# Patient Record
Sex: MALE | Race: WHITE | NOT HISPANIC OR LATINO | Employment: OTHER | URBAN - METROPOLITAN AREA
[De-identification: names, ages, dates, MRNs, and addresses within clinical notes are randomized per-mention and may not be internally consistent; named-entity substitution may affect disease eponyms.]

---

## 2017-01-24 ENCOUNTER — HISTORICAL (OUTPATIENT)
Dept: ADMINISTRATIVE | Facility: HOSPITAL | Age: 72
End: 2017-01-24

## 2017-02-22 ENCOUNTER — HISTORICAL (OUTPATIENT)
Dept: ADMINISTRATIVE | Facility: HOSPITAL | Age: 72
End: 2017-02-22

## 2017-02-24 ENCOUNTER — HISTORICAL (OUTPATIENT)
Dept: LAB | Facility: HOSPITAL | Age: 72
End: 2017-02-24

## 2017-03-06 ENCOUNTER — HISTORICAL (OUTPATIENT)
Dept: RADIOLOGY | Facility: HOSPITAL | Age: 72
End: 2017-03-06

## 2017-04-04 ENCOUNTER — HISTORICAL (OUTPATIENT)
Dept: ADMINISTRATIVE | Facility: HOSPITAL | Age: 72
End: 2017-04-04

## 2017-05-02 ENCOUNTER — HISTORICAL (OUTPATIENT)
Dept: ADMINISTRATIVE | Facility: HOSPITAL | Age: 72
End: 2017-05-02

## 2018-05-01 ENCOUNTER — HISTORICAL (OUTPATIENT)
Dept: ADMINISTRATIVE | Facility: HOSPITAL | Age: 73
End: 2018-05-01

## 2019-02-22 ENCOUNTER — HISTORICAL (OUTPATIENT)
Dept: LAB | Facility: HOSPITAL | Age: 74
End: 2019-02-22

## 2019-02-22 LAB
C DIFF INTERP: NEGATIVE
HEMOCCULT SP1 STL QL: POSITIVE
HEMOCCULT SP2 STL QL: POSITIVE

## 2019-02-25 LAB — FINAL CULTURE: NORMAL

## 2019-03-13 ENCOUNTER — HISTORICAL (OUTPATIENT)
Dept: ADMINISTRATIVE | Facility: HOSPITAL | Age: 74
End: 2019-03-13

## 2019-03-29 ENCOUNTER — HISTORICAL (OUTPATIENT)
Dept: ADMINISTRATIVE | Facility: HOSPITAL | Age: 74
End: 2019-03-29

## 2019-05-02 ENCOUNTER — HISTORICAL (OUTPATIENT)
Dept: ADMINISTRATIVE | Facility: HOSPITAL | Age: 74
End: 2019-05-02

## 2020-09-04 ENCOUNTER — HISTORICAL (OUTPATIENT)
Dept: ADMINISTRATIVE | Facility: HOSPITAL | Age: 75
End: 2020-09-04

## 2020-11-04 ENCOUNTER — HISTORICAL (OUTPATIENT)
Dept: ADMINISTRATIVE | Facility: HOSPITAL | Age: 75
End: 2020-11-04

## 2021-03-22 ENCOUNTER — HISTORICAL (OUTPATIENT)
Dept: ADMINISTRATIVE | Facility: HOSPITAL | Age: 76
End: 2021-03-22

## 2021-04-01 ENCOUNTER — HISTORICAL (OUTPATIENT)
Dept: RADIOLOGY | Facility: HOSPITAL | Age: 76
End: 2021-04-01

## 2021-05-19 ENCOUNTER — HISTORICAL (OUTPATIENT)
Dept: RESPIRATORY THERAPY | Facility: HOSPITAL | Age: 76
End: 2021-05-19

## 2021-10-05 ENCOUNTER — HISTORICAL (OUTPATIENT)
Dept: RADIOLOGY | Facility: HOSPITAL | Age: 76
End: 2021-10-05

## 2022-04-09 ENCOUNTER — HISTORICAL (OUTPATIENT)
Dept: ADMINISTRATIVE | Facility: HOSPITAL | Age: 77
End: 2022-04-09

## 2022-04-29 VITALS
SYSTOLIC BLOOD PRESSURE: 130 MMHG | WEIGHT: 250 LBS | WEIGHT: 262 LBS | HEIGHT: 71 IN | BODY MASS INDEX: 34.3 KG/M2 | HEIGHT: 71 IN | OXYGEN SATURATION: 94 % | SYSTOLIC BLOOD PRESSURE: 104 MMHG | WEIGHT: 262 LBS | HEIGHT: 70 IN | DIASTOLIC BLOOD PRESSURE: 70 MMHG | SYSTOLIC BLOOD PRESSURE: 113 MMHG | DIASTOLIC BLOOD PRESSURE: 73 MMHG | HEIGHT: 71 IN | SYSTOLIC BLOOD PRESSURE: 113 MMHG | BODY MASS INDEX: 36.68 KG/M2 | WEIGHT: 245 LBS | DIASTOLIC BLOOD PRESSURE: 60 MMHG | DIASTOLIC BLOOD PRESSURE: 73 MMHG | BODY MASS INDEX: 36.68 KG/M2 | BODY MASS INDEX: 35.79 KG/M2

## 2022-04-30 NOTE — OP NOTE
Patient:   Manjit Argueta            MRN: [id number]            FIN: [id number]               Age:   74 years     Sex:  Male     :  1945   Associated Diagnoses:   None   Author:   Ricardo Donaldson MD      Preoperative Diagnosis: Cataract Right eye    Postoperative Diagnosis: Cataract Right eye    Procedure: Phacoemulsification with intaocular lens implantations Right eye    Surgeon: Ricardo Donaldson MD    Assistant: Graciela Simon, [facility]    Anestheisa: MAC    Complications: None    The patient was brought into the operating suite, where the patient was correctly identified as was the operative eye via timeout.  The patient was prepped and draped in a sterile ophthalmic fashion.  A lid speculum was placed in the operative eye and the microscope was brought into place.  A 1.0mm paracentesis was then made at (12) o'clock.  The anterior chamber was filled with Endocoat.  A (temporal) clear corneal incision was made with a 2.4 mm keratome.  A 6 mm corneal marking ring was used to fouzia the cornea centered over the visual axis.  A 5.00 mm continuous curvilinear capsulorhexis was fashioned using a cystotome and microcapsular forceps.  Hydrodissection and hydrodelineation was performed with upreserved 1% Xylocaine.  The nucleus was then phacoemulsified with the Abbott phacoemulsification hand-piece with a total of (10) EFX.  The cortex was then removed with the I/A hand-piece. The lens model (ZCB00) with a power of (22.0) was placed in the capsular bag.  The Helon was then removed from the eye with the I/A hand piece.  The anterior chamber was inflated and the wounds were hydrated with BSS.  The wounds were checked with Weck-Enedelia sponges and found to be watertight.  The lid speculum was removed and topical antibiotics were placed on the operative eye.  The patient was brought to PACU in good condition.        Surgery Date 19 [facility]

## 2022-04-30 NOTE — OP NOTE
Patient:   Manjit Argueta            MRN: [id number]            FIN: [id number]               Age:   74 years     Sex:  Male     :  1945   Associated Diagnoses:   None   Author:   Ricardo Donaldson MD      Preoperative Diagnosis: Cataract Left eye    Postoperative Diagnosis: Cataract Left eye    Procedure: Phacoemulsification with intaocular lens implantations Left eye    Surgeon: Ricardo Donaldson MD    Assistant:  Graciela Simon, [facility]    Anestheisa: MAC    Complications: None    The patient was brought into the operating suite, where the patient was correctly identified as was the operative eye via timeout.  The patient was prepped and draped in a sterile ophthalmic fashion.  A lid speculum was placed in the operative eye and the microscope was brought into place.  A 1.0mm paracentesis was then made at (6) o'clock.  The anterior chamber was filled with Endocoat.  A (temporal) clear corneal incision was made with a 2.4 mm keratome.  A 6 mm corneal marking ring was used to fouzia the cornea centered over the visual axis.  A 5.00 mm continuous curvilinear capsulorhexis was fashioned using a cystotome and microcapsular forceps.  Hydrodissection and hydrodelineation was performed with upreserved 1% Xylocaine.  The nucleus was then phacoemulsified with the Abbott phacoemulsification hand-piece with a total of (17) EFX.  The cortex was then removed with the I/A hand-piece. The lens model (ZCB00) with a power of (22.0) was placed in the capsular bag.  The Helon was then removed from the eye with the I/A hand piece.  The anterior chamber was inflated and the wounds were hydrated with BSS.  The wounds were checked with Weck-Enedelia sponges and found to be watertight.  The lid speculum was removed and topical antibiotics were placed on the operative eye.  The patient was brought to PACU in good condition.        Surgery Date 19 [facility]

## 2022-05-02 NOTE — HISTORICAL [facility] CERNER
This is a historical note converted from Krystle. Formatting and pictures may have been removed.  Please reference Krystle for original formatting and attached multimedia. Chief Complaint  1 YEAR S/P RIGHT BETTINA. DOING WELL. WORKS OUT OFTEN AT THE CLUB. HE IS CONCERNED WITH LEG LENGTH DISCREPANCY  History of Present Illness  73-year-old male presents today for follow-up after right total hip arthroplasty. ?He is year out from surgery and doing fairly well. ?Denies any complaints of groin pain or other pain to his right leg.? Does have a perceived leg length discrepancy?that he does wear shoe lift of one quarter of an inch?in his left shoe. ?He is very happy overall with the surgery and pain relief.  Review of Systems  Denies fevers, chills, chest pain, shortness of breath. Comprehensive review of systems performed and otherwise negative except as noted in HPI.  Physical Exam  Vitals & Measurements  BP:?113/73?  HT:?180.34?cm? HT:?180.34?cm? WT:?118.84?kg? WT:?118.84?kg? BMI:?36.54?  General: No acute distress, alert and oriented, healthy appearing?  HEENT: Head is atraumatic, mucous membranes are moist  Neck: Supples, no JVD  Cardiovascular: Palpable dorsalis pedis and posterior tibial pulses, regular rate and rhythm to those pulses  Lungs: Breathing non-labored  Skin: no rashes appreciated  Neurologic: Can flex and extend knees, ankles, and toes. Sensation is grossly intact  ?  Hip exam:  Left?hip incision clean dry and intact. No erythema, drainage, or signs of infection  No swelling distally. No signs of DVT  No pain with logroll  Sensation intact distally to right foot  Positive FHL/EHL/gastrocsoleus/tib ant brisk capillary refill right foot  ?  Assessment/Plan  1.?Aftercare following hip joint replacement surgery  ?Patient status post right total hip arthroplasty. ?Is overall doing quite well.? X-rays today really showed no significant leg discrepancy based on the hip alone. ?Does appear to be coming from somewhat  distal. ?He does feel more comfortable with his quadrant shoe lift?and I recommend he continue this until he feels?stable.? We will plan to see him back?in a year.? We did discuss the current recommendations regarding antibiotic prophylaxis prior to any dental work or colonoscopies. Patient is aware of this and will contact her office should they need any prescriptions for antibiotics called in.  Ordered:  Clinic Follow up, *Est. 05/02/19 13:00:00 CDT, Order for future visit, Aftercare following hip joint replacement surgery, [facility]  Post-Op follow-up visit [id number] PC, Aftercare following hip joint replacement surgery, [facility], 05/01/18 14:58:00 CDT  XR Hip Right 2 Views, Routine, *Est. 05/01/19 3:00:00 CDT, Arthritis, None, Patient Bed, Patient Has IV?, Rad Type, Order for future visit, Aftercare following hip joint replacement surgery, Not Scheduled, *Est. 05/01/19 3:00:00 CDT  ?  Avascular necrosis of right femoral head  ?   Problem List/Past Medical History  Ongoing  Acid reflux  Arthritis  Avascular necrosis of right femoral head  Chronic obstructive pulmonary disease  Claustrophobia  Coronary artery disease  Heart attack  Obesity  Obstructive sleep apnea  Historical  Cancer of colon  Hx of Diabetes  Procedure/Surgical History  CARIE LANDEROS Total Hip Arthroplasty (Right) (03/20/2017), Replacement of Right Hip Joint with Synthetic Substitute, Open Approach (03/20/2017), Claviculectomy; partial. (12/05/2012), Coracoacromial ligament release, with or without acromioplasty. (12/05/2012), Other partial ostectomy of scapula, clavicle, and thorax [ribs and sternum] (12/05/2012), Rotator cuff repair (12/05/2012), Cholecystectomy, History of coronary artery disease with stent placement, Partial resection of colon, right shoulder.  Medications  aspirin 81 mg oral tablet, 81 mg= 1 tab(s), Oral, Daily  Co-Q10 200 mg oral capsule, 2 tab(s), Oral, Daily  Colace 100 mg oral capsule, 100 mg= 1 cap(s),  Oral, BID, PRN,? ?Not taking  Effient 10 mg oral tablet, 10 mg= 1 tab(s), Oral, Daily  Fish Oil oral capsule, 1000 IU, Oral, Daily  gabapentin 300 mg oral capsule, See Instructions  Gamma E Complex, Oral, Daily,? ?Not taking  glipiZIDE 2.5 mg oral tablet, extended release (XL tab), 2.5 mg= 1 tab(s), Oral, Daily  metformin 500 mg oral tablet, 500 mg= 1 tab(s), Oral, BID  Metoprolol Tartrate 25 mg oral tablet, 1/2 tab(s), Oral, BID  nitroglycerin 0.4 mg sublingual TAB, 0.4 mg= 1 tab(s), SL, q5min, PRN  Prevagen Extra Strength, Oral, Daily,? ?Not taking  Prilosec 20 mg oral delayed release capsule, 20 mg= 1 cap(s), Oral, Daily  PROAIR HFA 90 MCG INHALER, Oral, QID  ramipril 2.5 mg oral capsule, 2.5 mg= 1 cap(s), Oral, Daily  simvastatin 10 mg oral tablet, 10 mg= 1 tab(s), Oral, Once a day (at bedtime)  simvastatin 20 mg oral tablet, 20 mg= 1 tab(s), Oral, Once a day (at bedtime),? ?Not taking  TAMSULOSIN HCL 0.4 MG CAPSULE, 0.4 mg= 1 cap(s), Oral, Daily,? ?Not taking  Trelegy Ellipta inhalation powder, 1 puff(s), INH, Daily,? ?Not taking  Allergies  codeine?(Anxiety)  morphine?(Anxiety)  Social History  Alcohol  Current, Wine, 1-2 times per week, 12/01/2012  Employment/School  Employed, Highest education level: Some college., 12/01/2012  Home/Environment  Lives with Spouse. Home equipment: CPAP/BiPAP., 03/08/2017  Nutrition/Health  Regular, 12/01/2012  Substance Abuse - Denies Substance Abuse, 12/01/2012  Never, 01/24/2017  Tobacco  Past, Cigarettes, 40 per day., 12/01/2012  Family History  Diabetes mellitus type 2: Brother.  Primary malignant neoplasm of breast: Mother and Sister.  Primary malignant neoplasm of lung: Mother and Father.  Immunizations  Vaccine Date Status Comments   influenza virus vaccine, inactivated 02/2018 Recorded    pneumococcal 13-valent conjugate vaccine 02/17/2017 Recorded Roland   influenza virus vaccine, inactivated 12/2016 Recorded Dr. Castillo   pneumococcal 23-polyvalent vaccine 2015  Recorded    Diagnostic Results  AP lateral right taken and reviewed. ?Patient well fixed components no signs of loosening or subsidence.

## 2022-05-02 NOTE — HISTORICAL [facility] CERNER
This is a historical note converted from Cersana. Formatting and pictures may have been removed.  Please reference Cersana for original formatting and attached multimedia. Chief Complaint  Pt is here for a 1 year follow up right BETTINA. Pt denies pain.  History of Present Illness  74-year-old male presents today for follow-up total hip on the right side. ?Patient is 2 years out. ?He is doing very well. ?Pain is well controlled. ?Has no complaints today. ?He is very happy with his recovery.  Review of Systems  Denies fevers, chills, chest pain, shortness of breath. Comprehensive review of systems performed and otherwise negative except as noted in HPI.  Physical Exam  Vitals & Measurements  BP:?104/70?  HT:?180?cm? WT:?111.13?kg? BMI:?34.3?  General: No acute distress, alert and oriented, healthy appearing?  HEENT: Head is atraumatic, mucous membranes are moist  Neck: Supples, no JVD  Cardiovascular: Palpable dorsalis pedis and posterior tibial pulses, regular rate and rhythm to those pulses  Lungs: Breathing non-labored  Skin: no rashes appreciated  Neurologic: Can flex and extend knees, ankles, and toes. Sensation is grossly intact  ?  Hip exam:  Right?hip incision clean dry and intact. No erythema, drainage, or signs of infection  No swelling distally. No signs of DVT  No pain with logroll  Sensation intact distally to right foot  Positive FHL/EHL/gastrocsoleus/tib ant brisk capillary refill right foot  ?  Assessment/Plan  1.?Aftercare following hip joint replacement surgery?Z47.1  ?74-year-old status post total hip in the right side. ?He is doing great after his surgery. ?We will see him back in 3 years?or sooner should there be any issues.? We did discuss the current recommendations regarding antibiotic prophylaxis prior to any dental work or colonoscopies. Patient is aware of this and will contact her office should they need any prescriptions for antibiotics called in.  Ordered:  Office/Outpatient Visit Level 3  Established [id number] , Aftercare following hip joint replacement surgery, [facility], 05/02/19 13:50:00 CDT  ?  Orders:  Clinic Follow-up PRN, 05/02/19 13:52:00 CDT, Future Order, [facility]  Referrals  Clinic Follow-up PRN, 05/02/19 13:52:00 CDT, Future Order, [facility]   Problem List/Past Medical History  Ongoing  Acid reflux  Arthritis  Avascular necrosis of right femoral head  Chronic obstructive pulmonary disease  Claustrophobia  Coronary artery disease  Heart attack  Obesity  Obstructive sleep apnea  Historical  Cancer of colon  Hx of Diabetes  Procedure/Surgical History  97566 - LT CATARACT W/IOL 1 STA PHACO (Left) (03/29/2019)  Extracapsular cataract removal with insertion of intraocular lens prosthesis (1 stage procedure), manual or mechanical technique (eg, irrigation and aspiration or phacoemulsification) (03/29/2019)  Replacement of Left Lens with Synthetic Substitute, Percutaneous Approach (03/29/2019)  81140 - RT CATARACT W/IOL 1 STA PHACO (Right) (03/13/2019)  Extracapsular cataract removal with insertion of intraocular lens prosthesis (1 stage procedure), manual or mechanical technique (eg, irrigation and aspiration or phacoemulsification) (03/13/2019)  Replacement of Right Lens with Synthetic Substitute, Percutaneous Approach (03/13/2019)  CARIE LANDEROS Total Hip Arthroplasty (Right) (03/20/2017)  Replacement of Right Hip Joint with Synthetic Substitute, Open Approach (03/20/2017)  Claviculectomy; partial. (12/05/2012)  Coracoacromial ligament release, with or without acromioplasty. (12/05/2012)  Other partial ostectomy of scapula, clavicle, and thorax [ribs and sternum] (12/05/2012)  Rotator cuff repair (12/05/2012)  ANGIOGRAM X3  Cholecystectomy  Colonoscopy  History of coronary artery disease with stent placement  Partial resection of colon  right shoulder   Medications  aspirin 81 mg oral tablet, 81 mg= 1 tab(s), Oral, Daily  Co-Q10 200 mg oral capsule, 2 tab(s), Oral,  Daily  Effient 10 mg oral tablet, 10 mg= 1 tab(s), Oral, Daily  Fish Oil oral capsule, 1000 IU, Oral, Daily  glipiZIDE 2.5 mg oral tablet, extended release (XL tab), 2.5 mg= 1 tab(s), Oral, Daily  metformin 500 mg oral tablet, 500 mg= 1 tab(s), Oral, BID  Metoprolol Tartrate 25 mg oral tablet, 1/2 tab(s), Oral, BID  MONTELUKAST SOD 10 MG TABLET, 10 mg= 1 tab(s), Daily,? ?Not Taking per Prescriber  nitroglycerin 0.4 mg sublingual TAB, 0.4 mg= 1 tab(s), SL, q5min, PRN  penicillin V potassium 500 mg oral tablet, See Instructions,? ?Not taking  Prilosec 20 mg oral delayed release capsule, 20 mg= 1 cap(s), Oral, Daily  PROAIR HFA 90 MCG INHALER, Oral, QID  ramipril 2.5 mg oral capsule, 2.5 mg= 1 cap(s), Oral, Daily  simvastatin 10 mg oral tablet, 10 mg= 1 tab(s), Oral, Once a day (at bedtime)  Ventolin HFA 90 mcg/inh inhalation aerosol, 1 puff(s), INH, QID, PRN, 11 refills  Allergies  codeine?(Anxiety)  morphine?(Anxiety)  Social History  Alcohol  Current, Wine, 1-2 times per week, 12/01/2012  Employment/School  Employed, Highest education level: Some college., 12/01/2012  Home/Environment  Lives with Spouse. Home equipment: CPAP/BiPAP., 03/08/2017  Nutrition/Health  Regular, 12/01/2012  Substance Abuse - Denies Substance Abuse, 12/01/2012  Never, 01/24/2017  Tobacco  Former smoker, quit more than 30 days ago, N/A, 05/02/2019  Past, Cigarettes, 40 per day., 12/01/2012  Family History  Diabetes mellitus type 2: Brother.  Primary malignant neoplasm of breast: Mother and Sister.  Primary malignant neoplasm of lung: Mother and Father.  Immunizations  Vaccine Date Status Comments   influenza virus vaccine, inactivated 09/24/2018 Given    influenza virus vaccine, inactivated 02/2018 Recorded    pneumococcal 13-valent conjugate vaccine 02/17/2017 Recorded [facility]   influenza virus vaccine, inactivated 12/2016 Recorded Dr. Castillo   pneumococcal 23-polyvalent vaccine 2015 Recorded    [facility]  Maintenance  ???Pending?(in the next year)  ??? ??OverDue  ??? ? ? ?Coronary Artery Disease Maintenance-Lipid Lowering Therapy due??and every?  ??? ? ? ?Pneumococcal Vaccine due??and every?  ??? ? ? ?Hypertension Management-BMP due??03/21/18??and every 1??year(s)  ??? ? ? ?Coronary Artery Disease Maintenance-BMP due??03/21/18??and every 1??year(s)  ??? ? ? ?Aspirin Therapy for CVD Prevention due??03/21/18??and every 1??year(s)  ??? ? ? ?Advance Directive due??01/01/19??and every 1??year(s)  ??? ? ? ?Alcohol Misuse Screening due??01/01/19??and every 1??year(s)  ??? ? ? ?Cognitive Screening due??01/01/19??and every 1??year(s)  ??? ? ? ?Functional Assessment due??01/01/19??and every 1??year(s)  ??? ? ? ?Geriatric Depression Screening due??01/01/19??and every 1??year(s)  ??? ? ? ?Smoking Cessation due??01/01/19??Variable frequency  ??? ??Due?  ??? ? ? ?ADL Screening due??05/02/19??and every 1??year(s)  ??? ? ? ?COPD Maintenance-Spirometry due??05/02/19??and every?  ??? ? ? ?HF-LVEF due??05/02/19??and every?  ??? ? ? ?Tetanus Vaccine due??05/02/19??and every 10??year(s)  ??? ? ? ?Zoster Vaccine due??05/02/19??and every 100??year(s)  ??? ??Due In Future?  ??? ? ? ?Fall Risk Assessment not due until??01/01/20??and every 1??year(s)  ??? ? ? ?Obesity Screening not due until??01/01/20??and every 1??year(s)  ??? ? ? ?Hypertension Management-Blood Pressure not due until??02/04/20??and every 1??year(s)  ??? ? ? ?Colorectal Screening ([facility]) not due until??02/22/20??and every 1??year(s)  ??? ? ? ?HF-Heart Failure Education not due until??03/28/20??and every 1??year(s)  ??? ? ? ?Coronary Artery Disease Maintenance-Electrocardiogram not due until??03/29/20??and every 1??year(s)  ???Satisfied?(in the past 1 year)  ??? ??Satisfied?  ??? ? ? ?Blood Pressure Screening on??05/02/19.??Satisfied by Carolyn De Jesus LPN  ??? ? ? ?Body Mass Index Check on??05/02/19.??Satisfied by Carolyn De Jesus LPN  ??? ? ? ?Colorectal Screening  ([facility]) on??02/22/19.??Satisfied by Zari Lemus  ??? ? ? ?Coronary Artery Disease Maintenance-Electrocardiogram on??03/29/19.??Satisfied by Laura REYNOSO, Filomena Bowen  ??? ? ? ?Depression Screening on??05/02/19.??Satisfied by Carolyn De Jesus LPN  ??? ? ? ?Fall Risk Assessment on??05/02/19.??Satisfied by Carolyn De Jesus LPN  ??? ? ? ?Hypertension Management-Blood Pressure on??05/02/19.??Satisfied by Carolyn De Jesus LPN  ??? ? ? ?Influenza Vaccine on??09/24/18.??Satisfied by Lida Baptiste  ??? ? ? ?Obesity Screening on??05/02/19.??Satisfied by Carolyn De Jesus LPN  ?  ?  Diagnostic Results  AP lateral right?hip reviewed. ?Patients implants appear well fixed. ?No signs of loosening or subsidence.

## 2022-05-02 NOTE — HISTORICAL [facility] CERNER
This is a historical note converted from Krystle. Formatting and pictures may have been removed.  Please reference Krystle for original formatting and attached multimedia. Chief Complaint  Here for follow up on rt THR 03/20/17 global through 06/18/17  History of Present Illness  72-year-old?male presents for follow-up after?right total hip. ?He is 6 weeks out from surgery. ?Patient is doing very well. ?His pain is very well controlled he is off of all narcotics. ?He is using a single crutch?although he does feel that he is ready for a cane. ?He is very satisfied with his recovery?and feels much improved from preoperative levels.? Denies any groin pain or other issues.  Review of Systems  Denies fevers, chills, chest pain, shortness of breath. Review of systems otherwise negative except as in HPI.? Hip exam:  ?  Physical Exam  Vitals & Measurements  BP:?113/73? HT:?180.34?cm? HT:?180.34?cm? WT:?118.84?kg? WT:?118.84?kg? BMI:?36.54?  Right?hip incision clean dry and intact. No erythema, drainage, or signs of infection  No swelling distally. No signs of DVT  No pain with logroll  Sensation intact distally to right foot  Positive FHL/EHL/gastrocsoleus/tib ant brisk capillary refill right foot  ?  Assessment/Plan  1.?Avascular necrosis of right femoral head  Patient is 6 weeks out from right total hip replacement. ?He is very happy with his recovery.? I have instructed the patient that he should be ready to wean back to a cane?he should do this whenever he is able.? Otherwise he is very happy?and doing very well. ?We will see him back in a years time or sooner should there be any issues.? We did discuss the current recommendations regarding antibiotic prophylaxis prior to any dental work or colonoscopies. Patient is aware of this and will contact her office should they need any prescriptions for antibiotics called in.  Ordered:  Clinic Follow up, *Est. 05/01/18 14:00:00 CDT  Post-Op follow-up visit [id number] PC, Avascular  necrosis of right femoral head  Aftercare following hip joint replacement surgery, [facility], 05/02/17 14:30:00 CDT  XR Hip Right 2 Views, Routine, *Est. 05/02/18 3:00:00 CDT, Arthritis, None, Patient Bed, Patient Has IV?, Rad Type, Order for future visit, Avascular necrosis of right femoral head  Aftercare following hip joint replacement surgery, Not Scheduled, 12, month(s), In Approxi...  ?  2.?Aftercare following hip joint replacement surgery  Ordered:  Clinic Follow up, *Est. 05/01/18 14:00:00 CDT  Post-Op follow-up visit [id number] PC, Avascular necrosis of right femoral head  Aftercare following hip joint replacement surgery, [facility], 05/02/17 14:30:00 CDT  XR Hip Right 2 Views, Routine, *Est. 05/02/18 3:00:00 CDT, Arthritis, None, Patient Bed, Patient Has IV?, Rad Type, Order for future visit, Avascular necrosis of right femoral head  Aftercare following hip joint replacement surgery, Not Scheduled, 12, month(s), In Approxi...  ?   Problem List/Past Medical History  Acid reflux  Arthritis  Avascular necrosis of right femoral head  Claustrophobia  Coronary artery disease  Heart attack  Obesity  Historical  Cancer of colon  Hx of Diabetes  Procedure/Surgical History  CARIEMICHAEL LANDEROS Total Hip Arthroplasty (Right) (03/20/2017), Replacement of Right Hip Joint with Synthetic Substitute, Open Approach (03/20/2017), Claviculectomy; partial. (12/05/2012), Coracoacromial ligament release, with or without acromioplasty. (12/05/2012), Other partial ostectomy of scapula, clavicle, and thorax [ribs and sternum] (12/05/2012), Rotator cuff repair (12/05/2012), Cholecystectomy, History of coronary artery disease with stent placement, Partial resection of colon, right shoulder.  Medications  aspirin 81 mg oral tablet, 81 mg, 1 tab(s), Oral, Daily  Co-Q10 200 mg oral capsule, 2 tab(s), Oral, Daily  Colace 100 mg oral capsule, 100 mg, 1 cap(s), Oral, BID, PRN  Effient 10 mg oral tablet, 10 mg, 1 tab(s),  Oral, Daily  Fish Oil oral capsule, 1000 IU, Oral, Daily  gabapentin 300 mg oral capsule, See Instructions  Gamma E Complex, Oral, Daily  glipiZIDE 2.5 mg oral tablet, extended release (XL tab), 2.5 mg, 1 tab(s), Oral, Daily  metformin 500 mg oral tablet, 500 mg, 1 tab(s), Oral, BID  Metoprolol Tartrate 25 mg oral tablet, 1/2 tab(s), Oral, BID  nitroglycerin 0.4 mg sublingual TAB, 0.4 mg, 1 tab(s), SL, q5min, PRN  Prevagen Extra Strength, Oral, Daily  Prilosec 20 mg oral delayed release capsule, 20 mg, 1 cap(s), Oral, Daily  PROAIR HFA 90 MCG INHALER, Oral, QID  ramipril 2.5 mg oral capsule, 2.5 mg, 1 cap(s), Oral, Daily  simvastatin 20 mg oral tablet, 20 mg, 1 tab(s), Oral, Once a day (at bedtime)  Allergies  codeine?(Anxiety)  morphine?(Anxiety)  Social History  Alcohol  Current, Wine, 1-2 times per week  Employment/School  Employed, Highest education level: Some college.  Home/Environment  Lives with Spouse. Home equipment: CPAP/BiPAP.  Nutrition/Health  Regular  Substance Abuse - Denies Substance Abuse  Never  Tobacco  Past, Cigarettes, 40 per day.  Family History  Diabetes mellitus type 2: Brother.  Primary malignant neoplasm of breast: Mother and Sister.  Primary malignant neoplasm of lung: Mother and Father.  Diagnostic Results  AP lateral right hip taken and reviewed. ?Patient with well fixed components no signs of subsidence or loosening.

## 2022-08-24 DIAGNOSIS — E11.9 TYPE 2 DIABETES MELLITUS WITHOUT COMPLICATION, UNSPECIFIED WHETHER LONG TERM INSULIN USE: Primary | ICD-10-CM

## 2022-10-17 PROBLEM — Q33.2 PULMONARY SEQUESTRATION: Status: ACTIVE | Noted: 2022-10-17

## 2022-10-17 PROBLEM — J44.9 CHRONIC OBSTRUCTIVE PULMONARY DISEASE: Status: ACTIVE | Noted: 2022-10-17

## 2022-11-10 ENCOUNTER — OFFICE VISIT (OUTPATIENT)
Dept: ORTHOPEDICS | Facility: CLINIC | Age: 77
End: 2022-11-10
Payer: MEDICARE

## 2022-11-10 ENCOUNTER — HOSPITAL ENCOUNTER (OUTPATIENT)
Dept: RADIOLOGY | Facility: CLINIC | Age: 77
Discharge: HOME | End: 2022-11-10
Attending: ORTHOPAEDIC SURGERY
Payer: MEDICARE

## 2022-11-10 VITALS
WEIGHT: 246 LBS | SYSTOLIC BLOOD PRESSURE: 104 MMHG | HEIGHT: 69 IN | HEART RATE: 69 BPM | DIASTOLIC BLOOD PRESSURE: 63 MMHG | BODY MASS INDEX: 36.43 KG/M2

## 2022-11-10 DIAGNOSIS — G89.29 CHRONIC PAIN OF LEFT KNEE: ICD-10-CM

## 2022-11-10 DIAGNOSIS — M25.562 CHRONIC PAIN OF LEFT KNEE: ICD-10-CM

## 2022-11-10 DIAGNOSIS — G89.29 CHRONIC PAIN OF LEFT KNEE: Primary | ICD-10-CM

## 2022-11-10 DIAGNOSIS — M70.42 PREPATELLAR BURSITIS OF LEFT KNEE: ICD-10-CM

## 2022-11-10 DIAGNOSIS — M25.562 CHRONIC PAIN OF LEFT KNEE: Primary | ICD-10-CM

## 2022-11-10 PROCEDURE — 1125F PR PAIN SEVERITY QUANTIFIED, PAIN PRESENT: ICD-10-PCS | Mod: CPTII,,, | Performed by: ORTHOPAEDIC SURGERY

## 2022-11-10 PROCEDURE — 3078F PR MOST RECENT DIASTOLIC BLOOD PRESSURE < 80 MM HG: ICD-10-PCS | Mod: CPTII,,, | Performed by: ORTHOPAEDIC SURGERY

## 2022-11-10 PROCEDURE — 1125F AMNT PAIN NOTED PAIN PRSNT: CPT | Mod: CPTII,,, | Performed by: ORTHOPAEDIC SURGERY

## 2022-11-10 PROCEDURE — 3288F FALL RISK ASSESSMENT DOCD: CPT | Mod: CPTII,,, | Performed by: ORTHOPAEDIC SURGERY

## 2022-11-10 PROCEDURE — 1159F PR MEDICATION LIST DOCUMENTED IN MEDICAL RECORD: ICD-10-PCS | Mod: CPTII,,, | Performed by: ORTHOPAEDIC SURGERY

## 2022-11-10 PROCEDURE — 73564 X-RAY EXAM KNEE 4 OR MORE: CPT | Mod: LT,,, | Performed by: ORTHOPAEDIC SURGERY

## 2022-11-10 PROCEDURE — 1159F MED LIST DOCD IN RCRD: CPT | Mod: CPTII,,, | Performed by: ORTHOPAEDIC SURGERY

## 2022-11-10 PROCEDURE — 3288F PR FALLS RISK ASSESSMENT DOCUMENTED: ICD-10-PCS | Mod: CPTII,,, | Performed by: ORTHOPAEDIC SURGERY

## 2022-11-10 PROCEDURE — 99204 OFFICE O/P NEW MOD 45 MIN: CPT | Mod: ,,, | Performed by: ORTHOPAEDIC SURGERY

## 2022-11-10 PROCEDURE — 3074F PR MOST RECENT SYSTOLIC BLOOD PRESSURE < 130 MM HG: ICD-10-PCS | Mod: CPTII,,, | Performed by: ORTHOPAEDIC SURGERY

## 2022-11-10 PROCEDURE — 1101F PR PT FALLS ASSESS DOC 0-1 FALLS W/OUT INJ PAST YR: ICD-10-PCS | Mod: CPTII,,, | Performed by: ORTHOPAEDIC SURGERY

## 2022-11-10 PROCEDURE — 3078F DIAST BP <80 MM HG: CPT | Mod: CPTII,,, | Performed by: ORTHOPAEDIC SURGERY

## 2022-11-10 PROCEDURE — 3074F SYST BP LT 130 MM HG: CPT | Mod: CPTII,,, | Performed by: ORTHOPAEDIC SURGERY

## 2022-11-10 PROCEDURE — 1101F PT FALLS ASSESS-DOCD LE1/YR: CPT | Mod: CPTII,,, | Performed by: ORTHOPAEDIC SURGERY

## 2022-11-10 PROCEDURE — 73564 XR KNEE COMP 4 OR MORE VIEWS LEFT: ICD-10-PCS | Mod: LT,,, | Performed by: ORTHOPAEDIC SURGERY

## 2022-11-10 PROCEDURE — 99204 PR OFFICE/OUTPT VISIT, NEW, LEVL IV, 45-59 MIN: ICD-10-PCS | Mod: ,,, | Performed by: ORTHOPAEDIC SURGERY

## 2022-11-10 RX ORDER — MELOXICAM 15 MG/1
15 TABLET ORAL DAILY
Qty: 30 TABLET | Refills: 0 | Status: SHIPPED | OUTPATIENT
Start: 2022-11-10 | End: 2023-10-04

## 2022-11-10 NOTE — PROGRESS NOTES
Past Medical History:   Diagnosis Date    CAD (coronary artery disease)     followed by Dr. Albarran    Chronic pneumothorax     right side    Colon cancer     Essential (primary) hypertension     H/O heart artery stent     Mixed hyperlipidemia     Type 2 diabetes mellitus without complications        Past Surgical History:   Procedure Laterality Date    CHOLECYSTECTOMY      COLECTOMY      CORONARY ANGIOPLASTY      PLEURODESIS WITH VIDEO-ASSISTED THORACOSCOPIC SURGERY (VATS)      SHOULDER SURGERY Right        Current Outpatient Medications   Medication Sig    albuterol (PROVENTIL/VENTOLIN HFA) 90 mcg/actuation inhaler INHALE 2 PUFFS BY MOUTH 4 TIMES DAILY    aspirin 81 mg Cap aspirin Take No date recorded No form recorded No frequency recorded No route recorded No set duration recorded No set duration amount recorded active No dosage strength recorded No dosage strength units of measure recorded    glipiZIDE (GLUCOTROL) 2.5 MG TR24 glipizide Take No date recorded No form recorded No frequency recorded No route recorded No set duration recorded No set duration amount recorded active No dosage strength recorded No dosage strength units of measure recorded    hydroCHLOROthiazide (MICROZIDE) 12.5 mg capsule hydrochlorothiazide Take No date recorded No form recorded No frequency recorded No route recorded No set duration recorded No set duration amount recorded active No dosage strength recorded No dosage strength units of measure recorded    metoprolol tartrate (LOPRESSOR) 25 MG tablet metoprolol tartrate Take No date recorded No form recorded No frequency recorded No route recorded No set duration recorded No set duration amount recorded active No dosage strength recorded No dosage strength units of measure recorded    nitroGLYCERIN (NITROSTAT) 0.4 MG SL tablet Place 0.4 mg under the tongue every 5 (five) minutes as needed.    omeprazole (PRILOSEC) 20 MG capsule     ramipriL (ALTACE) 2.5 MG capsule     simvastatin  (ZOCOR) 10 MG tablet     tamsulosin (FLOMAX) 0.4 mg Cap      No current facility-administered medications for this visit.       Review of patient's allergies indicates:   Allergen Reactions    Amoxicillin      Other reaction(s): UTI - Urinary tract infection    Codeine      Other reaction(s): Anxiety    Morphine      Other reaction(s): Anxiety       Family History   Problem Relation Age of Onset    Diabetes Mellitus Brother        Social History     Socioeconomic History    Marital status:    Tobacco Use    Smoking status: Former     Packs/day: 2.00     Years: 40.00     Pack years: 80.00     Types: Cigarettes    Smokeless tobacco: Never   Substance and Sexual Activity    Alcohol use: Never    Drug use: Never       Chief Complaint:   Chief Complaint   Patient presents with    Left Knee - Pain     Left knee pain. Been hurting him for 3 days. Can hardly bend it and hard for him to walk. Sensitive on the outside. Pain is staying in knee and not radiating. Pt tried warm and cold compresses and didn't help.        History of present illness:  77-year-old male presents today for evaluation follow-up of left knee pain.  Patient states over the last 5-7 days he has ordered noted worsening pain to the anterior aspect of his left knee.  Significant pain with light touch.  Did have a slight injury was crawling on the ground.  Has noted some mild redness.  No significant swelling.      Review of Systems:    Constitution: Negative for chills, fever, and sweats.  Negative for unexplained weight loss.    HENT:  Negative for headaches and blurry vision.    Cardiovascular:Negative for chest pain or irregular heart beat. Negative for hypertension.    Respiratory:  Negative for cough and shortness of breath.    Gastrointestinal: Negative for abdominal pain, heartburn, melena, nausea, and vomitting.    Genitourinary:  Negative bladder incontinence and dysuria.    Musculoskeletal:  See HPI    Neurological: Negative for  numbness.    Psychiatric/Behavioral: Negative for depression.  The patient is not nervous/anxious.      Endocrine: Negative for polyuria    Hematologic/Lymphatic: Negative for bleeding problem.  Does not bruise/bleed easily.    Skin: Negative for poor would healing and rash      Physical Examination:    Vital Signs:    Vitals:    11/10/22 0819   BP: 104/63   Pulse: 69       Body mass index is 36.33 kg/m².    General: No acute distress, alert and oriented, healthy appearing    HEENT: Head is atraumatic, mucous membranes are moist    Neck: Supples, no JVD    Cardiovascular: Palpable dorsalis pedis and posterior tibial pulses, regular rate and rhythm to those pulses    Lungs: Breathing non-labored    Skin: no rashes appreciated    Neurologic: Can flex and extend knees, ankles, and toes. Sensation is grossly intact    Left knee:  No effusion noted.  Brisk cap refill distally.  Sensation intact distally.  Patient with very significant tenderness over his prepatellar bursa.  Does have a very mild amount of redness.  No induration.    X-rays:  Four views of the left knee reviewed.  No significant arthritic change noted     Assessment::  Left knee prepatellar bursitis    Plan:  We will plan to place him on some topical anti-inflammatories as well as some meloxicam.  We will see him back on an as-needed basis.  If he has worsening, he will contact us.  There was a very small chance this could be infectious etiology although does not appear like that currently.    This note was created using EndPlay voice recognition software that occasionally misinterpreted phrases or words.    Consult note is delivered via Epic messaging service.

## 2023-11-08 DIAGNOSIS — J98.3: ICD-10-CM

## 2023-11-08 DIAGNOSIS — J44.9 CHRONIC OBSTRUCTIVE PULMONARY DISEASE: Primary | ICD-10-CM

## 2023-11-08 DIAGNOSIS — R06.02 SOB (SHORTNESS OF BREATH): ICD-10-CM

## 2025-01-08 ENCOUNTER — TELEPHONE (OUTPATIENT)
Dept: INTERNAL MEDICINE | Facility: CLINIC | Age: 80
End: 2025-01-08
Payer: MEDICARE

## 2025-01-08 NOTE — TELEPHONE ENCOUNTER
----- Message from Yuki sent at 1/8/2025 10:18 AM CST -----  .Who Called: Manjit Argueta    Caller is requesting assistance/information from provider's office.    Symptoms (please be specific): n/a   How long has patient had these symptoms:  n/a  List of preferred pharmacies on file (remove unneeded): [unfilled]  If different, enter pharmacy into here including location and phone number: n/a      Preferred Method of Contact: Phone Call    Patient's Preferred Phone Number on File: [phone]     Best Call Back Number, if different:    Additional Information: Pt called stating he didn't realize his appointment on 01/28 is for 8:40a. Pt stated that time is a little early for him, due to hs wife's passing and he now has to rely on himself to get up and it's pretty hard. Pt is requesting a call if a later time on that date becomes available. Please advise,thank you.

## 2025-01-20 ENCOUNTER — HOSPITAL ENCOUNTER (EMERGENCY)
Facility: HOSPITAL | Age: 80
Discharge: HOME | End: 2025-01-20
Attending: STUDENT IN AN ORGANIZED HEALTH CARE EDUCATION/TRAINING PROGRAM
Payer: MEDICARE

## 2025-01-20 VITALS
OXYGEN SATURATION: 95 % | RESPIRATION RATE: 20 BRPM | SYSTOLIC BLOOD PRESSURE: 119 MMHG | HEIGHT: 71 IN | DIASTOLIC BLOOD PRESSURE: 61 MMHG | HEART RATE: 65 BPM | WEIGHT: 209 LBS | TEMPERATURE: 98 F | BODY MASS INDEX: 29.26 KG/M2

## 2025-01-20 DIAGNOSIS — B99.9 INFECTION: ICD-10-CM

## 2025-01-20 DIAGNOSIS — M70.21 OLECRANON BURSITIS, RIGHT ELBOW: Primary | ICD-10-CM

## 2025-01-20 DIAGNOSIS — L03.113 CELLULITIS OF RIGHT ELBOW: ICD-10-CM

## 2025-01-20 LAB
ALBUMIN SERPL-MCNC: 3.5 G/DL (ref 3.4–4.8)
ALBUMIN/GLOB SERPL: 0.9 RATIO (ref 1.1–2)
ALP SERPL-CCNC: 61 UNIT/L (ref 40–150)
ALT SERPL-CCNC: 14 UNIT/L (ref 0–55)
ANION GAP SERPL CALC-SCNC: 10 MEQ/L
AST SERPL-CCNC: 22 UNIT/L (ref 5–34)
BASOPHILS # BLD AUTO: 0.06 X10(3)/MCL
BASOPHILS NFR BLD AUTO: 0.5 %
BILIRUB SERPL-MCNC: 0.9 MG/DL
BUN SERPL-MCNC: 14.3 MG/DL (ref 8.4–25.7)
CALCIUM SERPL-MCNC: 9.1 MG/DL (ref 8.8–10)
CHLORIDE SERPL-SCNC: 99 MMOL/L (ref 98–107)
CO2 SERPL-SCNC: 28 MMOL/L (ref 23–31)
CREAT SERPL-MCNC: 0.91 MG/DL (ref 0.72–1.25)
CREAT/UREA NIT SERPL: 16
CRP SERPL-MCNC: 34.8 MG/L
EOSINOPHIL # BLD AUTO: 0.29 X10(3)/MCL (ref 0–0.9)
EOSINOPHIL NFR BLD AUTO: 2.5 %
ERYTHROCYTE [DISTWIDTH] IN BLOOD BY AUTOMATED COUNT: 12.4 % (ref 11.5–17)
ERYTHROCYTE [SEDIMENTATION RATE] IN BLOOD: 33 MM/HR (ref 0–15)
GFR SERPLBLD CREATININE-BSD FMLA CKD-EPI: >60 ML/MIN/1.73/M2
GLOBULIN SER-MCNC: 3.7 GM/DL (ref 2.4–3.5)
GLUCOSE SERPL-MCNC: 99 MG/DL (ref 82–115)
HCT VFR BLD AUTO: 47.7 % (ref 42–52)
HGB BLD-MCNC: 15.8 G/DL (ref 14–18)
IMM GRANULOCYTES # BLD AUTO: 0.12 X10(3)/MCL (ref 0–0.04)
IMM GRANULOCYTES NFR BLD AUTO: 1 %
LACTATE SERPL-SCNC: 1.9 MMOL/L (ref 0.5–2.2)
LYMPHOCYTES # BLD AUTO: 2.38 X10(3)/MCL (ref 0.6–4.6)
LYMPHOCYTES NFR BLD AUTO: 20.3 %
MCH RBC QN AUTO: 31.4 PG (ref 27–31)
MCHC RBC AUTO-ENTMCNC: 33.1 G/DL (ref 33–36)
MCV RBC AUTO: 94.8 FL (ref 80–94)
MONOCYTES # BLD AUTO: 0.99 X10(3)/MCL (ref 0.1–1.3)
MONOCYTES NFR BLD AUTO: 8.5 %
NEUTROPHILS # BLD AUTO: 7.87 X10(3)/MCL (ref 2.1–9.2)
NEUTROPHILS NFR BLD AUTO: 67.2 %
NRBC BLD AUTO-RTO: 0 %
PLATELET # BLD AUTO: 315 X10(3)/MCL (ref 130–400)
PMV BLD AUTO: 8.7 FL (ref 7.4–10.4)
POTASSIUM SERPL-SCNC: 4.3 MMOL/L (ref 3.5–5.1)
PROT SERPL-MCNC: 7.2 GM/DL (ref 5.8–7.6)
RBC # BLD AUTO: 5.03 X10(6)/MCL (ref 4.7–6.1)
SODIUM SERPL-SCNC: 137 MMOL/L (ref 136–145)
WBC # BLD AUTO: 11.71 X10(3)/MCL (ref 4.5–11.5)

## 2025-01-20 PROCEDURE — 85652 RBC SED RATE AUTOMATED: CPT | Performed by: NURSE PRACTITIONER

## 2025-01-20 PROCEDURE — 85025 COMPLETE CBC W/AUTO DIFF WBC: CPT | Performed by: NURSE PRACTITIONER

## 2025-01-20 PROCEDURE — 86140 C-REACTIVE PROTEIN: CPT | Performed by: NURSE PRACTITIONER

## 2025-01-20 PROCEDURE — 83605 ASSAY OF LACTIC ACID: CPT | Performed by: NURSE PRACTITIONER

## 2025-01-20 PROCEDURE — 99284 EMERGENCY DEPT VISIT MOD MDM: CPT | Mod: 25

## 2025-01-20 PROCEDURE — 80053 COMPREHEN METABOLIC PANEL: CPT | Performed by: NURSE PRACTITIONER

## 2025-01-20 RX ORDER — SULFAMETHOXAZOLE AND TRIMETHOPRIM 800; 160 MG/1; MG/1
1 TABLET ORAL 2 TIMES DAILY
Qty: 20 TABLET | Refills: 0 | Status: SHIPPED | OUTPATIENT
Start: 2025-01-20 | End: 2025-01-30

## 2025-01-20 NOTE — DISCHARGE INSTRUCTIONS
Bactrim ds twice a day for 10 days. Tylenol and ibuprofen. Keep clean and dry. Avoid hitting it.     Follow up for worsening symptoms as needed with primary care or ortho

## 2025-01-20 NOTE — ED PROVIDER NOTES
"Encounter Date: 1/20/2025       History     Chief Complaint   Patient presents with    Pain     Pt having right arm pain 9/10. Pt states hitting his "funny bone" multiple times over the last 2 weeks causing pain. Some swelling and redness noted. Pt referred from [facility] urgent care for further evaluation of swelling    Arm Injury     See MDM    The history is provided by the patient. No  was used.     Review of patient's allergies indicates:   Allergen Reactions    Amoxicillin      Other reaction(s): UTI - Urinary tract infection    Codeine      Other reaction(s): Anxiety    Morphine      Other reaction(s): Anxiety     Past Medical History:   Diagnosis Date    CAD (coronary artery disease)     followed by Dr. Albarran    Chronic pneumothorax     right side    Colon cancer     Essential (primary) hypertension     H/O heart artery stent     Mixed hyperlipidemia     Type 2 diabetes mellitus without complications      Past Surgical History:   Procedure Laterality Date    CHOLECYSTECTOMY      COLECTOMY      CORONARY ANGIOPLASTY      PLEURODESIS WITH VIDEO-ASSISTED THORACOSCOPIC SURGERY (VATS)      SHOULDER SURGERY Right      Family History   Problem Relation Name Age of Onset    Diabetes Mellitus Brother       Social History     Tobacco Use    Smoking status: Former     Current packs/day: 2.00     Average packs/day: 2.0 packs/day for 40.0 years (80.0 ttl pk-yrs)     Types: Cigarettes    Smokeless tobacco: Never   Substance Use Topics    Alcohol use: Never    Drug use: Never     Review of Systems   Musculoskeletal:  Positive for joint swelling (right elbow pain/swelling/redness).   All other systems reviewed and are negative.      Physical Exam     Initial Vitals [01/20/25 1256]   BP Pulse Resp Temp SpO2   119/61 65 20 97.7 °F (36.5 °C) 95 %      MAP       --         Physical Exam    Nursing note and vitals reviewed.  Constitutional: He appears well-developed and well-nourished.   Cardiovascular:  Normal " rate, regular rhythm, normal heart sounds and intact distal pulses.           Pulmonary/Chest: Breath sounds normal. No respiratory distress.   Musculoskeletal:      Right elbow: Effusion present. Decreased range of motion. Tenderness present.      Comments: Olecranon bursitis with some redness over the skin. He is able to extend completely. Limited flexion to about 80-90 degrees s/t pain he states. Warm over the right elbow but not hot.     All other adjacent joints otherwise normal       Neurological: He is alert and oriented to person, place, and time.   Skin: Skin is warm and dry.   Psychiatric: He has a normal mood and affect.         ED Course   Procedures  Labs Reviewed   CBC WITH DIFFERENTIAL - Abnormal       Result Value    WBC 11.71 (*)     RBC 5.03      Hgb 15.8      Hct 47.7      MCV 94.8 (*)     MCH 31.4 (*)     MCHC 33.1      RDW 12.4      Platelet 315      MPV 8.7      Neut % 67.2      Lymph % 20.3      Mono % 8.5      Eos % 2.5      Basophil % 0.5      Imm Grans % 1.0      Neut # 7.87      Lymph # 2.38      Mono # 0.99      Eos # 0.29      Baso # 0.06      Imm Gran # 0.12 (*)     NRBC% 0.0     COMPREHENSIVE METABOLIC PANEL - Abnormal    Sodium 137      Potassium 4.3      Chloride 99      CO2 28      Glucose 99      Blood Urea Nitrogen 14.3      Creatinine 0.91      Calcium 9.1      Protein Total 7.2      Albumin 3.5      Globulin 3.7 (*)     Albumin/Globulin Ratio 0.9 (*)     Bilirubin Total 0.9      ALP 61      ALT 14      AST 22      eGFR >60      Anion Gap 10.0      BUN/Creatinine Ratio 16     SEDIMENTATION RATE, AUTOMATED - Abnormal    Sed Rate 33 (*)    C-REACTIVE PROTEIN - Abnormal    CRP 34.80 (*)    LACTIC ACID, PLASMA - Normal    Lactic Acid Level 1.9            Imaging Results              X-Ray Elbow Complete Right (Final result)  Result time 01/20/25 14:02:18      Final result by Holland Ruiz MD (01/20/25 14:02:18)                   Impression:      No osseous abnormality  identified.      Electronically signed by: Holland Joseph  Date:    01/20/2025  Time:    14:02               Narrative:    EXAMINATION:  Right elbow    CLINICAL HISTORY:  Unspecified infectious disease    TECHNIQUE:  Three views.    COMPARISON:  None available.    FINDINGS:  Right elbow articular surfaces are unremarkable and there is no intrinsic osseous abnormality.  There is no acute fracture, dislocation or arthritic change.  There is no lytic destructive or sclerotic abnormality.  There is unremarkable mineralization of the bones.  No soft tissue calcifications.                                       Medications - No data to display  Medical Decision Making  78 y/o male who has pmhx htn, diabetes, hypercholesterolemia presents from urgent care for further evaluation and management o fhis right elbow pain, swelling, redness. He states started about 10-14 days ago with swelling and discomfort. He has hit it a few times over the last 2 weeks. He states he went to urgent care on 11th and was given steroids. This helped briefly but then returned. Seems to be getting worse with redness, warmth, pain, swelling. No fever. No chills/sweats. No antibiotics.     He is not toxic in appearance. Wbc 11, sed and crp mildly elevated. Glucose controlled. Lactate neg. Xray neg.     Discussed with dr. Valdez (on call ortho). He reviewed imaging and discussed HPI and findings. He feels can treat with bactrim ds and try to manage outpatient for now. Close ER precautions.     Amount and/or Complexity of Data Reviewed  Labs: ordered. Decision-making details documented in ED Course.  Radiology: ordered. Decision-making details documented in ED Course.  Discussion of management or test interpretation with external provider(s): Discussed with Dr. Valdez (on call ortho). See above for recommendations.     Risk  Prescription drug management.      Additional MDM:   Differential Diagnosis:   Other: The following diagnoses were also  considered and will be evaluated: olecranon bursitis, cellulitis and septic joint.                                   Clinical Impression:  Final diagnoses:  [B99.9] Infection  [M70.21] Olecranon bursitis, right elbow (Primary)  [L03.113] Cellulitis of right elbow          ED Disposition Condition    Discharge Stable          ED Prescriptions       Medication Sig Dispense Start Date End Date Auth. Provider    sulfamethoxazole-trimethoprim 800-160mg (BACTRIM DS) 800-160 mg Tab Take 1 tablet by mouth 2 (two) times daily. for 10 days 20 tablet 1/20/2025 1/30/2025 Chelsea Atkinson FNP          Follow-up Information       Follow up With Specialties Details Why Contact Info    Brenda Wilkerson MD Internal Medicine Call in 1 week As needed [address]  [place] [id number] [phone]               Chelsea Atkinson FNP  01/20/25 0883

## 2025-01-24 ENCOUNTER — TELEPHONE (OUTPATIENT)
Dept: INTERNAL MEDICINE | Facility: CLINIC | Age: 80
End: 2025-01-24
Payer: MEDICARE

## 2025-01-28 ENCOUNTER — OFFICE VISIT (OUTPATIENT)
Dept: INTERNAL MEDICINE | Facility: CLINIC | Age: 80
End: 2025-01-28
Payer: MEDICARE

## 2025-01-28 VITALS
HEIGHT: 71 IN | BODY MASS INDEX: 29.82 KG/M2 | WEIGHT: 213 LBS | OXYGEN SATURATION: 97 % | TEMPERATURE: 98 F | SYSTOLIC BLOOD PRESSURE: 98 MMHG | DIASTOLIC BLOOD PRESSURE: 55 MMHG | HEART RATE: 60 BPM

## 2025-01-28 DIAGNOSIS — E11.69 TYPE 2 DIABETES MELLITUS WITH OTHER SPECIFIED COMPLICATION, WITHOUT LONG-TERM CURRENT USE OF INSULIN: ICD-10-CM

## 2025-01-28 DIAGNOSIS — C18.9 MALIGNANT NEOPLASM OF COLON, UNSPECIFIED PART OF COLON: ICD-10-CM

## 2025-01-28 DIAGNOSIS — J43.1 PANLOBULAR EMPHYSEMA: ICD-10-CM

## 2025-01-28 DIAGNOSIS — M70.31 BURSITIS OF RIGHT ELBOW, UNSPECIFIED BURSA: Primary | ICD-10-CM

## 2025-01-28 DIAGNOSIS — N40.0 ENLARGED PROSTATE: ICD-10-CM

## 2025-01-28 DIAGNOSIS — M87.059 AVASCULAR NECROSIS OF FEMORAL HEAD, UNSPECIFIED LATERALITY: ICD-10-CM

## 2025-01-28 PROCEDURE — 3074F SYST BP LT 130 MM HG: CPT | Mod: CPTII,,, | Performed by: STUDENT IN AN ORGANIZED HEALTH CARE EDUCATION/TRAINING PROGRAM

## 2025-01-28 PROCEDURE — 1159F MED LIST DOCD IN RCRD: CPT | Mod: CPTII,,, | Performed by: STUDENT IN AN ORGANIZED HEALTH CARE EDUCATION/TRAINING PROGRAM

## 2025-01-28 PROCEDURE — 3078F DIAST BP <80 MM HG: CPT | Mod: CPTII,,, | Performed by: STUDENT IN AN ORGANIZED HEALTH CARE EDUCATION/TRAINING PROGRAM

## 2025-01-28 PROCEDURE — 1160F RVW MEDS BY RX/DR IN RCRD: CPT | Mod: CPTII,,, | Performed by: STUDENT IN AN ORGANIZED HEALTH CARE EDUCATION/TRAINING PROGRAM

## 2025-01-28 PROCEDURE — 1101F PT FALLS ASSESS-DOCD LE1/YR: CPT | Mod: CPTII,,, | Performed by: STUDENT IN AN ORGANIZED HEALTH CARE EDUCATION/TRAINING PROGRAM

## 2025-01-28 PROCEDURE — 3288F FALL RISK ASSESSMENT DOCD: CPT | Mod: CPTII,,, | Performed by: STUDENT IN AN ORGANIZED HEALTH CARE EDUCATION/TRAINING PROGRAM

## 2025-01-28 PROCEDURE — 99204 OFFICE O/P NEW MOD 45 MIN: CPT | Mod: ,,, | Performed by: STUDENT IN AN ORGANIZED HEALTH CARE EDUCATION/TRAINING PROGRAM

## 2025-01-28 RX ORDER — ORAL SEMAGLUTIDE 14 MG/1
14 TABLET ORAL EVERY MORNING
COMMUNITY
Start: 2025-01-03

## 2025-01-29 ENCOUNTER — TELEPHONE (OUTPATIENT)
Dept: INTERNAL MEDICINE | Facility: CLINIC | Age: 80
End: 2025-01-29
Payer: MEDICARE

## 2025-01-29 NOTE — TELEPHONE ENCOUNTER
Joint did not look infected on physical exam, no need to start antibiotics at this time. Can take antiinflammatory such as ibuprofen as needed for the pain until he can get in with Ortho and please advise patient to call clinic if swelling worsens or joint becomes tender and warm

## 2025-01-29 NOTE — TELEPHONE ENCOUNTER
----- Message from Yuki sent at 1/29/2025 10:24 AM CST -----  .Who Called: Manjit Argueta    Refill or New Rx:New Rx    RX Name and Strength: sulfamethoxazole-trimethoprim 800-160mg (BACTRIM DS) 800-160 mg Tab    How is the patient currently taking it? (ex. 1XDay): Sig - Route: Take 1 tablet by mouth 2 (two) times daily. for 10 days - Oral    Is this a 30 day or 90 day RX: n/a    Local or Mail Order: Local    List of preferred pharmacies on file (remove unneeded): [facility] [address] [place] [address]   Phone: [phone]  Fax: [phone]    Ordering Provider: N/A    Preferred Method of Contact: Phone Call    Patient's Preferred Phone Number on File: [phone]     Best Call Back Number, if different:    Additional Information: Pt stated he's unable to see an Orthopedic until March 17th. Stated since he was taking antibiotics his elbow has gone down, but not completely. Requesting medication above he received from ER. Pt is requesting a cb. Please advise, thank you.

## 2025-01-30 ENCOUNTER — TELEPHONE (OUTPATIENT)
Dept: INTERNAL MEDICINE | Facility: CLINIC | Age: 80
End: 2025-01-30
Payer: MEDICARE

## 2025-01-30 PROBLEM — M70.31 BURSITIS OF RIGHT ELBOW: Status: ACTIVE | Noted: 2025-01-30

## 2025-01-30 PROBLEM — N40.0 ENLARGED PROSTATE: Status: ACTIVE | Noted: 2025-01-30

## 2025-01-30 NOTE — TELEPHONE ENCOUNTER
Referral sent to Dr. Neumann's office. Just waiting on note to send, Please advise when done. Thank you

## 2025-01-30 NOTE — TELEPHONE ENCOUNTER
----- Message from Judit sent at 1/30/2025  3:08 PM CST -----  Regarding: referral update  Good Afternoon,     This is Judit with [facility](referral scheduling)  I  contacted [facility] urology to follow up on  referral.  Laura stated they did not  receive referral. She suggested we resubmit and fax to [phone].  Thanks

## 2025-01-31 NOTE — PROGRESS NOTES
Subjective:      Manjit Argueta  01/30/2025  [id number]      Chief Complaint: Establish Care       HPI:  Mr Saravia is a 80 y/o male patient who is here to establish care. Patient states he recently was diagnosed with bursitis of right elbow, was prescribed Bactrim and pain and tenderness have improved, states still has mild swelling of joint. Patient has history of colon cancer with resection of most of his colon, does not have an ostomy. Has been told by his GI physician that his prostate is enlarged and was recommended to see a Urologist. Has history of avascular necrosis of femoral head, has had a hip replacement. States has been diagnosed with emphysema, follows with Pulmonology and is controlled with albuterol inhaler which he seldomly uses.     Past Medical History:   Diagnosis Date    CAD (coronary artery disease)     followed by Dr. Albarran    Chronic pneumothorax     right side    Colon cancer     Essential (primary) hypertension     H/O heart artery stent     Mixed hyperlipidemia     Type 2 diabetes mellitus without complications      Past Surgical History:   Procedure Laterality Date    CHOLECYSTECTOMY      COLECTOMY      CORONARY ANGIOPLASTY      PLEURODESIS WITH VIDEO-ASSISTED THORACOSCOPIC SURGERY (VATS)      SHOULDER SURGERY Right      Family History   Problem Relation Name Age of Onset    Diabetes Mellitus Brother       Social History     Tobacco Use    Smoking status: Former     Current packs/day: 2.00     Average packs/day: 2.0 packs/day for 40.0 years (80.0 ttl pk-yrs)     Types: Cigarettes    Smokeless tobacco: Never   Substance and Sexual Activity    Alcohol use: Never    Drug use: Never    Sexual activity: Not on file     Review of patient's allergies indicates:   Allergen Reactions    Amoxicillin      Other reaction(s): UTI - Urinary tract infection    Codeine      Other reaction(s): Anxiety    Morphine      Other reaction(s): Anxiety       The following were reviewed at this visit: active problem  list, medication list, allergies, family history, social history, and health maintenance.    Medications:    Current Outpatient Medications:     albuterol (PROVENTIL/VENTOLIN HFA) 90 mcg/actuation inhaler, INHALE 2 PUFFS BY MOUTH 4 TIMES DAILY, Disp: , Rfl:     aspirin 81 mg Cap, aspirin Take No date recorded No form recorded No frequency recorded No route recorded No set duration recorded No set duration amount recorded active No dosage strength recorded No dosage strength units of measure recorded, Disp: , Rfl:     hydroCHLOROthiazide (MICROZIDE) 12.5 mg capsule, hydrochlorothiazide Take No date recorded No form recorded No frequency recorded No route recorded No set duration recorded No set duration amount recorded active No dosage strength recorded No dosage strength units of measure recorded, Disp: , Rfl:     metoprolol tartrate (LOPRESSOR) 25 MG tablet, metoprolol tartrate Take No date recorded No form recorded No frequency recorded No route recorded No set duration recorded No set duration amount recorded active No dosage strength recorded No dosage strength units of measure recorded, Disp: , Rfl:     nitroGLYCERIN (NITROSTAT) 0.4 MG SL tablet, Place 0.4 mg under the tongue every 5 (five) minutes as needed., Disp: , Rfl:     omeprazole (PRILOSEC) 20 MG capsule, , Disp: , Rfl:     ramipriL (ALTACE) 2.5 MG capsule, , Disp: , Rfl:     RYBELSUS 14 mg tablet, Take 14 mg by mouth every morning., Disp: , Rfl:     simvastatin (ZOCOR) 10 MG tablet, , Disp: , Rfl:     tamsulosin (FLOMAX) 0.4 mg Cap, , Disp: , Rfl:     glipiZIDE (GLUCOTROL) 2.5 MG TR24, glipizide Take No date recorded No form recorded No frequency recorded No route recorded No set duration recorded No set duration amount recorded active No dosage strength recorded No dosage strength units of measure recorded (Patient not taking: Reported on 1/28/2025), Disp: , Rfl:     sulfamethoxazole-trimethoprim 800-160mg (BACTRIM DS) 800-160 mg Tab, Take 1 tablet by  "mouth 2 (two) times daily. for 10 days, Disp: 20 tablet, Rfl: 0      Medications have been reviewed and reconciled with patient at this visit.  Barriers to medications reviewed with patient.    Adverse reactions to current medications reviewed with patient..    Over the counter medications reviewed and reconciled with patient.  Review of Systems   Constitutional:  Negative for chills, diaphoresis, fever and weight loss.   HENT:  Negative for congestion, ear discharge, sinus pain and sore throat.    Eyes:  Negative for photophobia.   Respiratory:  Negative for cough, hemoptysis and shortness of breath.    Cardiovascular:  Negative for chest pain, palpitations, claudication, leg swelling and PND.   Gastrointestinal:  Negative for constipation, diarrhea, nausea and vomiting.   Genitourinary:  Negative for dysuria, frequency and urgency.   Musculoskeletal:  Negative for back pain, joint pain, myalgias and neck pain.   Skin:  Negative for itching and rash.   Neurological:  Negative for dizziness, focal weakness and headaches.   Psychiatric/Behavioral:  Negative for depression. The patient does not have insomnia.            Objective:      Vitals:    01/28/25 0844   BP: (!) 98/55   Pulse: 60   Temp: 98 °F (36.7 °C)   SpO2: 97%   Weight: 96.6 kg (213 lb)   Height: 5' 11" (1.803 m)       Physical Exam  Constitutional:       Appearance: Normal appearance.   HENT:      Head: Normocephalic and atraumatic.   Cardiovascular:      Rate and Rhythm: Normal rate and regular rhythm.      Pulses: Normal pulses.   Pulmonary:      Effort: Pulmonary effort is normal.      Breath sounds: Normal breath sounds.   Abdominal:      General: Abdomen is flat. Bowel sounds are normal. There is no distension.      Palpations: Abdomen is soft.      Tenderness: There is no abdominal tenderness.   Musculoskeletal:         General: Normal range of motion.      Right lower leg: No edema.      Left lower leg: No edema.      Comments: Mild swelling of " right elbow, no tenderness or erythema    Lymphadenopathy:      Cervical: No cervical adenopathy.   Neurological:      General: No focal deficit present.      Mental Status: He is alert and oriented to person, place, and time.               Assessment and Plan:       1. Bursitis of right elbow, unspecified bursa  Assessment & Plan:  Improved after taking Bactrim  Will refer to Ortho since might need aspiration of fluid     Orders:  -     Ambulatory referral/consult to Orthopedics; Future; Expected date: 02/04/2025    2. Type 2 diabetes mellitus with other specified complication, without long-term current use of insulin  Assessment & Plan:  Will check A1C  Continue rybelsus     Orders:  -     Lipid Panel; Future; Expected date: 01/28/2025  -     Comprehensive Metabolic Panel; Future; Expected date: 01/28/2025  -     Hemoglobin A1C; Future; Expected date: 01/28/2025  -     Microalbumin/Creatinine Ratio, Urine    3. Malignant neoplasm of colon, unspecified part of colon  Assessment & Plan:  Resolved  Has had most of his colon removed, did not require an ostomy       4. Avascular necrosis of femoral head, unspecified laterality  Assessment & Plan:  S/p hip replacement  Follows with Orthopedics       5. Panlobular emphysema  Assessment & Plan:  Stable  Uses albuterol rarely  Follows with Pulmonology       6. Enlarged prostate  Assessment & Plan:  Advised by GI office to see Urology  Will check PSA   Will refer to Urology     Orders:  -     Ambulatory referral/consult to Urology; Future; Expected date: 02/04/2025            Follow up: Follow up in about 3 months (around 4/28/2025) for Diabetes f/u.

## 2025-01-31 NOTE — TELEPHONE ENCOUNTER
----- Message from Med Assistant Dawit sent at 1/31/2025  8:59 AM CST -----  Regarding: FW: med advice  Pt requesting bactrim sent to pharmacy.  ----- Message -----  From: Emerald Olvera  Sent: 1/31/2025   8:54 AM CST  To: Marv COLEMAN Staff  Subject: med advice                                       Who Called: Manjit Argueta    Caller is requesting assistance/information from provider's office.    Symptoms (please be specific):    How long has patient had these symptoms:    List of preferred pharmacies on file (remove unneeded): [unfilled]  If different, enter pharmacy into here including location and phone number:       Preferred Method of Contact: Phone Call  Patient's Preferred Phone Number on File: [phone]   Best Call Back Number, if different:  Additional Information: pt is requesting a call back regarding the status of med request for sulfamethoxazole-trimethoprim 800-160mg (BACTRIM DS) 800-160 mg Tab

## 2025-02-10 ENCOUNTER — TELEPHONE (OUTPATIENT)
Dept: INTERNAL MEDICINE | Facility: CLINIC | Age: 80
End: 2025-02-10
Payer: MEDICARE

## 2025-02-10 DIAGNOSIS — E11.69 TYPE 2 DIABETES MELLITUS WITH OTHER SPECIFIED COMPLICATION, WITHOUT LONG-TERM CURRENT USE OF INSULIN: ICD-10-CM

## 2025-02-10 DIAGNOSIS — M87.059 AVASCULAR NECROSIS OF FEMORAL HEAD, UNSPECIFIED LATERALITY: ICD-10-CM

## 2025-02-10 DIAGNOSIS — J43.1 PANLOBULAR EMPHYSEMA: ICD-10-CM

## 2025-02-10 DIAGNOSIS — E11.69 TYPE 2 DIABETES MELLITUS WITH OTHER SPECIFIED COMPLICATION, WITHOUT LONG-TERM CURRENT USE OF INSULIN: Primary | ICD-10-CM

## 2025-02-10 RX ORDER — RAMIPRIL 2.5 MG/1
2.5 CAPSULE ORAL DAILY
Qty: 30 CAPSULE | Refills: 0 | Status: SHIPPED | OUTPATIENT
Start: 2025-02-10 | End: 2025-02-13 | Stop reason: SDUPTHER

## 2025-02-10 RX ORDER — RAMIPRIL 2.5 MG/1
2.5 CAPSULE ORAL DAILY
Qty: 30 CAPSULE | Refills: 0 | Status: SHIPPED | OUTPATIENT
Start: 2025-02-10 | End: 2025-02-10 | Stop reason: SDUPTHER

## 2025-02-10 RX ORDER — TAMSULOSIN HYDROCHLORIDE 0.4 MG/1
0.4 CAPSULE ORAL DAILY
Qty: 30 CAPSULE | Refills: 0 | Status: SHIPPED | OUTPATIENT
Start: 2025-02-10 | End: 2025-02-10 | Stop reason: SDUPTHER

## 2025-02-10 RX ORDER — SIMVASTATIN 10 MG/1
20 TABLET, FILM COATED ORAL NIGHTLY
Qty: 30 TABLET | Refills: 0 | Status: SHIPPED | OUTPATIENT
Start: 2025-02-10 | End: 2025-02-10 | Stop reason: SDUPTHER

## 2025-02-10 RX ORDER — SIMVASTATIN 10 MG/1
20 TABLET, FILM COATED ORAL NIGHTLY
Qty: 30 TABLET | Refills: 0 | Status: SHIPPED | OUTPATIENT
Start: 2025-02-10 | End: 2025-02-13 | Stop reason: SDUPTHER

## 2025-02-10 RX ORDER — TAMSULOSIN HYDROCHLORIDE 0.4 MG/1
0.4 CAPSULE ORAL DAILY
Qty: 30 CAPSULE | Refills: 0 | Status: SHIPPED | OUTPATIENT
Start: 2025-02-10 | End: 2025-02-13 | Stop reason: SDUPTHER

## 2025-02-10 NOTE — TELEPHONE ENCOUNTER
----- Message from Ladan sent at 2/10/2025  1:56 PM CST -----  Who Called: Manjit Argueta    Caller is requesting assistance/information from provider's office.    Symptoms (please be specific):    How long has patient had these symptoms:    List of preferred pharmacies on file (remove unneeded): [unfilled]  If different, enter pharmacy into here including location and phone number:         Patient's Preferred Phone Number on File: [phone]   Best Call Back Number, if different:    Additional Information: pt called to inform provider that [facility] will be faxing over medication refill req , asked that provider signs order and fax back to pharmacy  , pt req a call back to confirm once completed

## 2025-02-11 DIAGNOSIS — I10 HYPERTENSION, UNSPECIFIED TYPE: Primary | ICD-10-CM

## 2025-02-11 RX ORDER — OMEPRAZOLE 20 MG/1
20 CAPSULE, DELAYED RELEASE ORAL DAILY
Qty: 90 CAPSULE | Refills: 2 | Status: SHIPPED | OUTPATIENT
Start: 2025-02-11 | End: 2025-02-13 | Stop reason: SDUPTHER

## 2025-02-11 RX ORDER — METOPROLOL TARTRATE 25 MG/1
25 TABLET, FILM COATED ORAL 2 TIMES DAILY
Qty: 90 TABLET | Refills: 2 | Status: SHIPPED | OUTPATIENT
Start: 2025-02-11 | End: 2025-02-13 | Stop reason: SDUPTHER

## 2025-02-11 RX ORDER — HYDROCHLOROTHIAZIDE 12.5 MG/1
12.5 CAPSULE ORAL DAILY
Qty: 90 CAPSULE | Refills: 2 | Status: SHIPPED | OUTPATIENT
Start: 2025-02-11 | End: 2025-02-13 | Stop reason: SDUPTHER

## 2025-02-13 DIAGNOSIS — M87.059 AVASCULAR NECROSIS OF FEMORAL HEAD, UNSPECIFIED LATERALITY: ICD-10-CM

## 2025-02-13 DIAGNOSIS — E11.69 TYPE 2 DIABETES MELLITUS WITH OTHER SPECIFIED COMPLICATION, WITHOUT LONG-TERM CURRENT USE OF INSULIN: Primary | ICD-10-CM

## 2025-02-13 DIAGNOSIS — E11.69 TYPE 2 DIABETES MELLITUS WITH OTHER SPECIFIED COMPLICATION, WITHOUT LONG-TERM CURRENT USE OF INSULIN: ICD-10-CM

## 2025-02-13 DIAGNOSIS — J43.1 PANLOBULAR EMPHYSEMA: ICD-10-CM

## 2025-02-13 DIAGNOSIS — I10 HYPERTENSION, UNSPECIFIED TYPE: ICD-10-CM

## 2025-02-13 RX ORDER — HYDROCHLOROTHIAZIDE 12.5 MG/1
12.5 CAPSULE ORAL DAILY
Qty: 90 CAPSULE | Refills: 2 | Status: SHIPPED | OUTPATIENT
Start: 2025-02-13

## 2025-02-13 RX ORDER — ORAL SEMAGLUTIDE 14 MG/1
14 TABLET ORAL DAILY
Qty: 30 TABLET | Refills: 1 | Status: SHIPPED | OUTPATIENT
Start: 2025-02-13

## 2025-02-13 RX ORDER — OMEPRAZOLE 20 MG/1
20 CAPSULE, DELAYED RELEASE ORAL DAILY
Qty: 90 CAPSULE | Refills: 2 | Status: SHIPPED | OUTPATIENT
Start: 2025-02-13

## 2025-02-13 RX ORDER — SIMVASTATIN 10 MG/1
20 TABLET, FILM COATED ORAL NIGHTLY
Qty: 90 TABLET | Refills: 2 | Status: SHIPPED | OUTPATIENT
Start: 2025-02-13

## 2025-02-13 RX ORDER — TAMSULOSIN HYDROCHLORIDE 0.4 MG/1
0.4 CAPSULE ORAL DAILY
Qty: 90 CAPSULE | Refills: 2 | Status: SHIPPED | OUTPATIENT
Start: 2025-02-13

## 2025-02-13 RX ORDER — METOPROLOL TARTRATE 25 MG/1
25 TABLET, FILM COATED ORAL 2 TIMES DAILY
Qty: 90 TABLET | Refills: 2 | Status: SHIPPED | OUTPATIENT
Start: 2025-02-13

## 2025-02-13 RX ORDER — RAMIPRIL 2.5 MG/1
2.5 CAPSULE ORAL DAILY
Qty: 90 CAPSULE | Refills: 2 | Status: SHIPPED | OUTPATIENT
Start: 2025-02-13

## 2025-02-17 ENCOUNTER — OFFICE VISIT (OUTPATIENT)
Dept: ORTHOPEDICS | Facility: CLINIC | Age: 80
End: 2025-02-17
Payer: MEDICARE

## 2025-02-17 VITALS
SYSTOLIC BLOOD PRESSURE: 113 MMHG | TEMPERATURE: 96 F | HEIGHT: 71 IN | WEIGHT: 213 LBS | HEART RATE: 68 BPM | DIASTOLIC BLOOD PRESSURE: 71 MMHG | BODY MASS INDEX: 29.82 KG/M2

## 2025-02-17 DIAGNOSIS — M70.21 OLECRANON BURSITIS OF RIGHT ELBOW: Primary | ICD-10-CM

## 2025-02-17 PROCEDURE — 1160F RVW MEDS BY RX/DR IN RCRD: CPT | Mod: CPTII,,, | Performed by: PHYSICIAN ASSISTANT

## 2025-02-17 PROCEDURE — 1101F PT FALLS ASSESS-DOCD LE1/YR: CPT | Mod: CPTII,,, | Performed by: PHYSICIAN ASSISTANT

## 2025-02-17 PROCEDURE — 3078F DIAST BP <80 MM HG: CPT | Mod: CPTII,,, | Performed by: PHYSICIAN ASSISTANT

## 2025-02-17 PROCEDURE — 1159F MED LIST DOCD IN RCRD: CPT | Mod: CPTII,,, | Performed by: PHYSICIAN ASSISTANT

## 2025-02-17 PROCEDURE — 3074F SYST BP LT 130 MM HG: CPT | Mod: CPTII,,, | Performed by: PHYSICIAN ASSISTANT

## 2025-02-17 PROCEDURE — 99213 OFFICE O/P EST LOW 20 MIN: CPT | Mod: ,,, | Performed by: PHYSICIAN ASSISTANT

## 2025-02-17 PROCEDURE — 3288F FALL RISK ASSESSMENT DOCD: CPT | Mod: CPTII,,, | Performed by: PHYSICIAN ASSISTANT

## 2025-02-17 NOTE — PROGRESS NOTES
Chief Complaint:   Chief Complaint   Patient presents with    Right Elbow - Swelling, Pain     Patient states he has bursitis in right elbow. Patient states he hit his elbow 6 times. Patient states elbow stayed warm up until about a week ago.  Patient finished 10 days on antibiotics. Patient states antibiotics helped.        History of present illness:    This is a 80 y.o. year old male   History of Present Illness    CHIEF COMPLAINT:  - Right elbow pain and swelling    HPI:  Manjit presents with elbow problems that began approximately 5-6 weeks ago. He reports hitting his elbow against his fiberglass trailer multiple times in close succession, followed by three more impacts against a sheetrock wall. The elbow became significantly swollen and filled with fluid following these incidents. Pain was localized to the point of the elbow. He sought treatment at the emergency room, where he was prescribed antibiotics. The antibiotics were highly effective, and his elbow felt significantly better the next day. He completed his antibiotic course about 2-3 weeks ago, which consisted of two pills a day for 10 days.    Currently, he reports no pain in the elbow but notes that he can still feel a small amount of fluid underneath. Prior to his visit, he had been taking half an ibuprofen tablet occasionally when he experienced mild discomfort, but has not taken any in a few days. He also reports taking a baby aspirin nightly.    He inquired about potentially receiving another short course of antibiotics, believing it might help resolve the remaining fluid. He has been considering using protective gear, such as elbow pads, to prevent future injuries.    Manjit denies taking blood thinners. Manjit's sister suggested using a Kotex pad wrapped with an ace bandage on the elbow.    IMAGING:  - X-rays of the elbow: The x-rays show a small bone spur on the elbow, which predates the recent injury    WORK STATUS:  - Manjit does some physical  work in his [employer] does not regularly engage in physically demanding work           Current Outpatient Medications   Medication Sig    albuterol (PROVENTIL/VENTOLIN HFA) 90 mcg/actuation inhaler INHALE 2 PUFFS BY MOUTH 4 TIMES DAILY    aspirin 81 mg Cap aspirin Take No date recorded No form recorded No frequency recorded No route recorded No set duration recorded No set duration amount recorded active No dosage strength recorded No dosage strength units of measure recorded    hydroCHLOROthiazide (MICROZIDE) 12.5 mg capsule Take 1 capsule (12.5 mg total) by mouth once daily.    metoprolol tartrate (LOPRESSOR) 25 MG tablet Take 1 tablet (25 mg total) by mouth 2 (two) times daily.    nitroGLYCERIN (NITROSTAT) 0.4 MG SL tablet Place 0.4 mg under the tongue every 5 (five) minutes as needed.    omeprazole (PRILOSEC) 20 MG capsule Take 1 capsule (20 mg total) by mouth once daily.    ramipriL (ALTACE) 2.5 MG capsule Take 1 capsule (2.5 mg total) by mouth once daily.    RYBELSUS 14 mg tablet Take 14 mg by mouth every morning. DX code E11.69    semaglutide (RYBELSUS) 14 mg tablet Take 1 tablet (14 mg total) by mouth once daily.    simvastatin (ZOCOR) 10 MG tablet Take 2 tablets (20 mg total) by mouth every evening.    tamsulosin (FLOMAX) 0.4 mg Cap Take 1 capsule (0.4 mg total) by mouth once daily.    glipiZIDE (GLUCOTROL) 2.5 MG TR24 glipizide Take No date recorded No form recorded No frequency recorded No route recorded No set duration recorded No set duration amount recorded active No dosage strength recorded No dosage strength units of measure recorded (Patient not taking: Reported on 2/17/2025)     No current facility-administered medications for this visit.       Review of Systems:    Constitution:   Denies chills, fever, and sweats.  HENT:   Denies headaches or blurry vision.  Cardiovascular:  Denies chest pain or irregular heart beat.  Respiratory:   Denies cough or shortness of breath.  Gastrointestinal:  " Denies abdominal pain, nausea, or vomiting.  Musculoskeletal:   Denies muscle cramps.  Neurological:   Denies dizziness or focal weakness.  Psychiatric/Behavior: Normal mental status.  Hematology/Lymph:  Denies bleeding problem or easy bruising/bleeding.  Skin:    Denies rash or suspicious lesions.    Examination:    Vital Signs:    Vitals:    02/17/25 1344   BP: 113/71   Pulse: 68   Temp: 96.3 °F (35.7 °C)   Weight: 96.6 kg (213 lb)   Height: 5' 11" (1.803 m)       Body mass index is 29.71 kg/m².    Constitution:   Well-developed, well nourished patient in no acute distress.  Neurological:   Alert and oriented x 3 and cooperative to examination.     Psychiatric/Behavior: Normal mental status.  Respiratory:   No shortness of breath.  Eyes:    Extraoccular muscles intact  Skin:    No scars, rash or suspicious lesions.    Physical Exam:   Elbow Exam:  Right  No obvious deformity.   ROM 0 to 130 degrees.   Negative varus and valgus stress test.   Supination and pronation to 90 degrees.   Negative tenderness to palpation over the lateral epicondyle.   Negative tenderness to palpation over the medial epicondyle.   Negative Tinel´s test.   No olecranon tenderness.   Mild olecranon swelling but no erythema  5/5 strength, normal skin appearance and palpable pulses distally.   Sensibility normal.    Imaging: X-rays reviewed from outside office.    FINDINGS:  Right elbow articular surfaces are unremarkable and there is no intrinsic osseous abnormality.  There is no acute fracture, dislocation or arthritic change.  There is no lytic destructive or sclerotic abnormality.  There is unremarkable mineralization of the bones.  No soft tissue calcifications.     Impression:          Assessment: There are no diagnoses linked to this encounter.     Plan:  Assessment & Plan    RIGHT ELBOW OSTEOPHYTE AND CONTUSION:  - Use elbow pads when doing physical activities to protect the elbow.  - Be careful not to bang the elbow.  - Take Aleve " twice daily for 1 week to help with inflammation.    FOLLOW-UP:  - Contact the office if the elbow starts to swell up, become red, or hot again.               DISCLAIMER: This note may have been dictated using voice recognition software and may contain grammatical errors.     NOTE: Consult report sent to referring provider via [employer] EMR.

## 2025-02-19 ENCOUNTER — TELEPHONE (OUTPATIENT)
Dept: INTERNAL MEDICINE | Facility: CLINIC | Age: 80
End: 2025-02-19
Payer: MEDICARE

## 2025-02-19 DIAGNOSIS — E11.69 TYPE 2 DIABETES MELLITUS WITH OTHER SPECIFIED COMPLICATION, WITHOUT LONG-TERM CURRENT USE OF INSULIN: Primary | ICD-10-CM

## 2025-02-19 RX ORDER — ORAL SEMAGLUTIDE 14 MG/1
14 TABLET ORAL EVERY MORNING
Qty: 30 TABLET | Refills: 5 | Status: SHIPPED | OUTPATIENT
Start: 2025-02-19

## 2025-02-19 NOTE — TELEPHONE ENCOUNTER
----- Message from Anastasiya sent at 2/19/2025  8:08 AM CST -----  Regarding: Prior Authorization  Rybelsus 14 MG Tablet                [facility]

## 2025-03-03 ENCOUNTER — TELEPHONE (OUTPATIENT)
Dept: INTERNAL MEDICINE | Facility: CLINIC | Age: 80
End: 2025-03-03
Payer: MEDICARE

## 2025-03-03 NOTE — TELEPHONE ENCOUNTER
----- Message from Perla sent at 3/3/2025  9:13 AM CST -----  .Type:  RX Refill RequestWho Called: ptRefill or New Rx:refill RX Name and Strength:Miracle Mouth Wash 150 mlHow is the patient currently taking it? (ex. 1XDay):2/day for 15 days Is this a 30 day or 90 day RX:30 Please add refills Preferred Pharmacy with phone number:[facility] Local or Mail Order:LocalOrdering Provider:Zuhair the patient rather a call back or a response via [facility]? [facility] Call Back Number:[phone] Additional Information: Miracle Mouth Wash 150 mlPlease add some refills.  Please call patient to let him know this has been sent to the pharmacy

## 2025-03-04 DIAGNOSIS — M87.059 AVASCULAR NECROSIS OF FEMORAL HEAD, UNSPECIFIED LATERALITY: ICD-10-CM

## 2025-03-04 DIAGNOSIS — J43.1 PANLOBULAR EMPHYSEMA: ICD-10-CM

## 2025-03-04 DIAGNOSIS — E11.69 TYPE 2 DIABETES MELLITUS WITH OTHER SPECIFIED COMPLICATION, WITHOUT LONG-TERM CURRENT USE OF INSULIN: ICD-10-CM

## 2025-03-05 RX ORDER — TAMSULOSIN HYDROCHLORIDE 0.4 MG/1
1 CAPSULE ORAL
Qty: 30 CAPSULE | Refills: 11 | Status: SHIPPED | OUTPATIENT
Start: 2025-03-05

## 2025-03-05 RX ORDER — RAMIPRIL 2.5 MG/1
2.5 CAPSULE ORAL
Qty: 30 CAPSULE | Refills: 11 | Status: SHIPPED | OUTPATIENT
Start: 2025-03-05

## 2025-03-07 ENCOUNTER — TELEPHONE (OUTPATIENT)
Dept: INTERNAL MEDICINE | Facility: CLINIC | Age: 80
End: 2025-03-07
Payer: MEDICARE

## 2025-03-07 NOTE — TELEPHONE ENCOUNTER
----- Message from Judy sent at 3/7/2025 11:01 AM CST -----  Who Called: Manjit ArguetaPatient is returning phone callWho Left Message for Patient:Does the patient know what this is regarding?:Preferred Method of Contact: Phone CallPatient's Preferred Phone Number on File: [phone] Best Call Back Number, if different:Additional Information: pt called to speak with nurse wants to come in asap due to veins across his head and eyebrows pls advise

## 2025-03-10 ENCOUNTER — OFFICE VISIT (OUTPATIENT)
Dept: INTERNAL MEDICINE | Facility: CLINIC | Age: 80
End: 2025-03-10
Payer: MEDICARE

## 2025-03-10 VITALS
DIASTOLIC BLOOD PRESSURE: 60 MMHG | BODY MASS INDEX: 29.65 KG/M2 | HEART RATE: 62 BPM | SYSTOLIC BLOOD PRESSURE: 110 MMHG | WEIGHT: 211.81 LBS | HEIGHT: 71 IN | RESPIRATION RATE: 16 BRPM

## 2025-03-10 DIAGNOSIS — I80.9 INFLAMMATION OF VEIN: Primary | ICD-10-CM

## 2025-03-10 DIAGNOSIS — G47.33 OBSTRUCTIVE SLEEP APNEA SYNDROME: ICD-10-CM

## 2025-03-10 NOTE — PROGRESS NOTES
Internal Medicine      Patient Name:  Manjit Argueta  Patient ID:  [id number]    Chief Complaint:  vein issues      Manjit Argueta is a 80 y.o. male, known to Dr Fair, is here today for requested visit.  Medical comorbidities include COPD, colon cancer, DM.    History of Present Illness    CHIEF COMPLAINT:  Mr. Argueta presents today for left-sided vein swelling.    HISTORY OF PRESENT ILLNESS:  He reports left-sided temporal vein swelling that started 3-4 days ago, initially noticed while looking in the mirror. He denies any associated pain. The vein extends FCI and then stops, with the swelling no longer present.    OCULAR HISTORY:  He experiences severe eye cramps and sensation of foreign body in eye without visible debris. He underwent cataract surgery with subsequent complications from scar tissue removal procedure approximately 1.5 years ago. He uses distilled water rinses, cooling eye drops, and previous prescription eye drops twice daily which provides temporary relief lasting up to 2 days.    FAMILY HISTORY:  He has significant family history of cardiovascular disease. His brother  from a massive heart attack in May of last year, which occurred 5-6 days after developing a blood clot in his eye that resulted in near-complete vision loss except for minimal white light perception.    MEDICAL HISTORY:  He has sleep apnea managed with CPAP.        Last AWV:  25 ([facility])       Past Medical History:   Diagnosis Date    CAD (coronary artery disease)     followed by Dr. Albarran    Chronic pneumothorax     right side    Colon cancer     Essential (primary) hypertension     H/O heart artery stent     Mixed hyperlipidemia     Type 2 diabetes mellitus without complications         Past Surgical History:   Procedure Laterality Date    CHOLECYSTECTOMY      COLECTOMY      CORONARY ANGIOPLASTY      PLEURODESIS WITH VIDEO-ASSISTED THORACOSCOPIC SURGERY (VATS)      SHOULDER SURGERY Right         Social History      Tobacco Use    Smoking status: Former     Current packs/day: 2.00     Average packs/day: 2.0 packs/day for 40.0 years (80.0 ttl pk-yrs)     Types: Cigarettes    Smokeless tobacco: Never   Substance and Sexual Activity    Alcohol use: Yes     Alcohol/week: 2.0 standard drinks of alcohol     Types: 2 Glasses of wine per week    Drug use: Never    Sexual activity: Not on file        Current Outpatient Medications   Medication Instructions    albuterol (PROVENTIL/VENTOLIN HFA) 90 mcg/actuation inhaler INHALE 2 PUFFS BY MOUTH 4 TIMES DAILY    aspirin 81 mg Cap aspirin Take No date recorded No form recorded No frequency recorded No route recorded No set duration recorded No set duration amount recorded active No dosage strength recorded No dosage strength units of measure recorded    hydroCHLOROthiazide (MICROZIDE) 12.5 mg, Oral, Daily    metoprolol tartrate (LOPRESSOR) 25 mg, Oral, 2 times daily    nitroGLYCERIN (NITROSTAT) 0.4 mg, Every 5 min PRN    NON FORMULARY MEDICATION 5 mLs, 2 times daily    omeprazole (PRILOSEC) 20 mg, Oral, Daily    ramipriL (ALTACE) 2.5 mg, Oral    RYBELSUS 14 mg, Oral, Daily    RYBELSUS 14 mg, Oral, Every morning, DX code E11.69    simvastatin (ZOCOR) 20 mg, Oral, Nightly    tamsulosin (FLOMAX) 0.4 mg, Oral       Review of patient's allergies indicates:   Allergen Reactions    Amoxicillin      Other reaction(s): UTI - Urinary tract infection    Codeine      Other reaction(s): Anxiety    Morphine      Other reaction(s): Anxiety        Patient Care Team:  Zo Horner MD as PCP - General (Internal Medicine)  Jeanmarie Albarran MD as Consulting Physician (Cardiology)  Rj Perez MD as Consulting Physician (Otolaryngology)  Best Choudhary MD (Dermatology)  Shan Neumann MD as Consulting Physician (Urology)       Subjective:    Review of Systems   Constitutional:  Negative for appetite change, chills, diaphoresis and fever.   HENT:  Negative for congestion, ear pain, sinus  "pain and sore throat.    Eyes:  Negative for pain and visual disturbance.   Respiratory:  Negative for cough, shortness of breath and wheezing.    Cardiovascular:  Negative for chest pain, palpitations and leg swelling.   Gastrointestinal:  Negative for abdominal pain, constipation, diarrhea, nausea and vomiting.   Endocrine: Negative for cold intolerance.   Genitourinary:  Negative for difficulty urinating, dysuria, frequency and hematuria.   Musculoskeletal:  Negative for arthralgias and myalgias.   Skin:  Negative for color change and rash.   Allergic/Immunologic: Negative.    Neurological: Negative.  Negative for dizziness, syncope, weakness, light-headedness, numbness and headaches.   Hematological: Negative.    Psychiatric/Behavioral: Negative.  Negative for dysphoric mood. The patient is not nervous/anxious.    All other systems reviewed and are negative.      Objective:    Visit Vitals  /60 (BP Location: Left arm, Patient Position: Sitting)   Pulse 62   Resp 16   Ht 5' 11" (1.803 m)   Wt 96.1 kg (211 lb 12.8 oz)   SpO2 (P) 96%   BMI 29.54 kg/m²       Physical Exam  Vitals and nursing note reviewed.   Constitutional:       General: He is not in acute distress.     Appearance: He is not ill-appearing.   HENT:      Head: Normocephalic and atraumatic.        Comments: Temporal vein bulging, nontender     Mouth/Throat:      Mouth: Mucous membranes are moist.      Pharynx: Oropharynx is clear.   Eyes:      General: No scleral icterus.     Extraocular Movements: Extraocular movements intact.      Conjunctiva/sclera: Conjunctivae normal.      Pupils: Pupils are equal, round, and reactive to light.   Neck:      Vascular: No carotid bruit.   Cardiovascular:      Rate and Rhythm: Normal rate and regular rhythm.      Heart sounds: Normal heart sounds. No murmur heard.     No friction rub. No gallop.   Pulmonary:      Effort: Pulmonary effort is normal. No respiratory distress.      Breath sounds: Normal breath " sounds. No wheezing, rhonchi or rales.   Abdominal:      General: Bowel sounds are normal. There is no distension.      Palpations: Abdomen is soft. There is no mass.      Tenderness: There is no abdominal tenderness.   Musculoskeletal:         General: Normal range of motion.      Cervical back: Normal range of motion and neck supple.   Skin:     General: Skin is warm and dry.      Capillary Refill: Capillary refill takes less than 2 seconds.   Neurological:      General: No focal deficit present.      Mental Status: He is alert and oriented to person, place, and time.   Psychiatric:         Mood and Affect: Mood normal.         Behavior: Behavior normal.       Labs Reviewed:    Chemistry:  Lab Results   Component Value Date     01/20/2025    K 4.3 01/20/2025    BUN 14.3 01/20/2025    CREATININE 0.91 01/20/2025    EGFRNORACEVR >60 01/20/2025    GLUCOSE 99 01/20/2025    CALCIUM 9.1 01/20/2025    ALKPHOS 61 01/20/2025    LABPROT 7.2 01/20/2025    ALBUMIN 3.5 01/20/2025    BILIDIR 0.2 07/29/2020    IBILI 0.20 07/29/2020    AST 22 01/20/2025    ALT 14 01/20/2025    MG 1.79 07/29/2020    PHOS 3.9 07/29/2020        Hematology:  Lab Results   Component Value Date    WBC 11.71 (H) 01/20/2025    HGB 15.8 01/20/2025    HCT 47.7 01/20/2025     01/20/2025       Urine:  Lab Results   Component Value Date    APPEARANCEUA CLEAR 07/25/2020    PROTEINUA Trace (A) 07/25/2020    LEUKOCYTESUR Negative 07/25/2020    RBCUA REVIEW 07/25/2020    WBCUA NONE SEEN 07/25/2020    BACTERIA NONE SEEN 07/25/2020        Assessment:      ICD-10-CM ICD-9-CM   1. Inflammation of vein  I80.9 451.9   2. Obstructive sleep apnea syndrome  G47.33 327.23        Plan:    1. Inflammation of vein  Assessment & Plan:  No associated pain.  May be related to enlarged temporal vein.  Will get ESR and CRP for completion.    Orders:  -     Sedimentation rate; Future; Expected date: 03/10/2025  -     C-Reactive Protein; Future; Expected date:  03/10/2025    2. Obstructive sleep apnea syndrome  Assessment & Plan:  Wears CPAP and reports compliance nightly. Life time use expected.  Avoid excessive alcohol, smoking and overuse of sedatives at bedtime.  Weight management discussed. Goal BMI <30.  Adjust sleep position PRN.          Follow up for Previously scheduled and PRN if need. In addition to their scheduled follow up, the patient has also been instructed to follow up on as needed basis.       Future Appointments   Date Time Provider Department Center   4/29/2025  9:40 AM Zo Horner MD [id number]   11/17/2025  8:30 AM [facility] CT1 [facility]   11/24/2025 10:40 AM [facility] Janneth Florentino, [facility]      Disclaimer:  This note was generated with the assistance of ambient listening technology. Verbal consent was obtained by the patient and accompanying visitor(s) for the recording of patient appointment to facilitate this note. I attest to having reviewed and edited the generated note for accuracy, though some syntax or spelling errors may persist. Please contact the author of this note for any clarification.

## 2025-03-10 NOTE — ASSESSMENT & PLAN NOTE
Wears CPAP and reports compliance nightly. Life time use expected.  Avoid excessive alcohol, smoking and overuse of sedatives at bedtime.  Weight management discussed. Goal BMI <30.  Adjust sleep position PRN.

## 2025-03-10 NOTE — ASSESSMENT & PLAN NOTE
No associated pain.  May be related to enlarged temporal vein.  Will get ESR and CRP for completion.

## 2025-03-11 ENCOUNTER — TELEPHONE (OUTPATIENT)
Dept: INTERNAL MEDICINE | Facility: CLINIC | Age: 80
End: 2025-03-11
Payer: MEDICARE

## 2025-03-20 ENCOUNTER — RESULTS FOLLOW-UP (OUTPATIENT)
Dept: INTERNAL MEDICINE | Facility: CLINIC | Age: 80
End: 2025-03-20

## 2025-03-20 ENCOUNTER — LAB VISIT (OUTPATIENT)
Dept: LAB | Facility: HOSPITAL | Age: 80
End: 2025-03-20
Attending: STUDENT IN AN ORGANIZED HEALTH CARE EDUCATION/TRAINING PROGRAM
Payer: MEDICARE

## 2025-03-20 ENCOUNTER — TELEPHONE (OUTPATIENT)
Dept: INTERNAL MEDICINE | Facility: CLINIC | Age: 80
End: 2025-03-20
Payer: MEDICARE

## 2025-03-20 DIAGNOSIS — E11.69 TYPE 2 DIABETES MELLITUS WITH OTHER SPECIFIED COMPLICATION, WITHOUT LONG-TERM CURRENT USE OF INSULIN: ICD-10-CM

## 2025-03-20 DIAGNOSIS — I80.9 INFLAMMATION OF VEIN: ICD-10-CM

## 2025-03-20 LAB
ALBUMIN SERPL-MCNC: 3.6 G/DL (ref 3.4–4.8)
ALBUMIN/GLOB SERPL: 1 RATIO (ref 1.1–2)
ALP SERPL-CCNC: 63 UNIT/L (ref 40–150)
ALT SERPL-CCNC: 12 UNIT/L (ref 0–55)
ANION GAP SERPL CALC-SCNC: 9 MEQ/L
AST SERPL-CCNC: 21 UNIT/L (ref 5–34)
BILIRUB SERPL-MCNC: 0.6 MG/DL
BUN SERPL-MCNC: 11 MG/DL (ref 8.4–25.7)
CALCIUM SERPL-MCNC: 8.9 MG/DL (ref 8.8–10)
CHLORIDE SERPL-SCNC: 103 MMOL/L (ref 98–107)
CHOLEST SERPL-MCNC: 135 MG/DL
CHOLEST/HDLC SERPL: 3 {RATIO} (ref 0–5)
CO2 SERPL-SCNC: 26 MMOL/L (ref 23–31)
CREAT SERPL-MCNC: 0.97 MG/DL (ref 0.72–1.25)
CREAT/UREA NIT SERPL: 11
CRP SERPL-MCNC: 2.8 MG/L
ERYTHROCYTE [SEDIMENTATION RATE] IN BLOOD: 57 MM/HR (ref 0–20)
EST. AVERAGE GLUCOSE BLD GHB EST-MCNC: 114 MG/DL
GFR SERPLBLD CREATININE-BSD FMLA CKD-EPI: >60 ML/MIN/1.73/M2
GLOBULIN SER-MCNC: 3.6 GM/DL (ref 2.4–3.5)
GLUCOSE SERPL-MCNC: 106 MG/DL (ref 82–115)
HBA1C MFR BLD: 5.6 %
HDLC SERPL-MCNC: 45 MG/DL (ref 35–60)
LDLC SERPL CALC-MCNC: 67 MG/DL (ref 50–140)
POTASSIUM SERPL-SCNC: 4.5 MMOL/L (ref 3.5–5.1)
PROT SERPL-MCNC: 7.2 GM/DL (ref 5.8–7.6)
SODIUM SERPL-SCNC: 138 MMOL/L (ref 136–145)
TRIGL SERPL-MCNC: 115 MG/DL (ref 34–140)
VLDLC SERPL CALC-MCNC: 23 MG/DL

## 2025-03-20 PROCEDURE — 80061 LIPID PANEL: CPT

## 2025-03-20 PROCEDURE — 83036 HEMOGLOBIN GLYCOSYLATED A1C: CPT

## 2025-03-20 PROCEDURE — 36415 COLL VENOUS BLD VENIPUNCTURE: CPT

## 2025-03-20 PROCEDURE — 85652 RBC SED RATE AUTOMATED: CPT

## 2025-03-20 PROCEDURE — 80053 COMPREHEN METABOLIC PANEL: CPT

## 2025-03-20 PROCEDURE — 86140 C-REACTIVE PROTEIN: CPT

## 2025-03-20 NOTE — TELEPHONE ENCOUNTER
----- Message from DURGA Michele sent at 3/20/2025  9:43 AM CDT -----  Spoke with patient and updated on lab results.  He states enlarged vein near left eye has improved.  He is scheduled to see GI later today to evaluate hemorrhoid.  ----- Message -----  From: Lab, Background User  Sent: 3/20/2025   8:42 AM CDT  To: DURGA Pichardo

## 2025-04-22 ENCOUNTER — TELEPHONE (OUTPATIENT)
Dept: INTERNAL MEDICINE | Facility: CLINIC | Age: 80
End: 2025-04-22
Payer: MEDICARE

## 2025-04-22 NOTE — TELEPHONE ENCOUNTER
ARE THERE ANY OUTSTANDING TASK IN PATIENT'S CHART? LAB DONE    IS THERE ANY DOCUMENTATION OF TASK IN CHART?        PLEASE HAVE PATIENT BRING A FULL LIST OR ACTUAL MEDICATIONS TO THE OFFICE VISIT

## 2025-04-24 ENCOUNTER — OFFICE VISIT (OUTPATIENT)
Dept: ORTHOPEDICS | Facility: CLINIC | Age: 80
End: 2025-04-24
Payer: MEDICARE

## 2025-04-24 VITALS
SYSTOLIC BLOOD PRESSURE: 113 MMHG | HEIGHT: 71 IN | HEART RATE: 61 BPM | DIASTOLIC BLOOD PRESSURE: 71 MMHG | BODY MASS INDEX: 28.7 KG/M2 | WEIGHT: 205 LBS

## 2025-04-24 DIAGNOSIS — M70.21 OLECRANON BURSITIS OF RIGHT ELBOW: Primary | ICD-10-CM

## 2025-04-24 NOTE — PROGRESS NOTES
Chief Complaint:   Chief Complaint   Patient presents with    Right Elbow - Pain     Pt hit his elbow about 6 times within a few days around December, had severe swelling and hot to the touch. Constant aching. Denies numbness/tingling. Can do yard work/heavy lifting w/o pain.        History of present illness:    History of Present Illness  The patient presents for evaluation of elbow pain.    Mild, persistent pain in the elbow is reported, described as slightly aggravating. A diagnosis of tendinitis and a bone spur in the affected area has been previously established. A consultation with Dr. Henry a few months ago revealed significant swelling, which has since improved. The bone spur can be surgically removed, but caution would be necessary post-procedure to prevent further injury. Despite the discomfort, the pain is not considered severe enough to warrant immediate surgical intervention. Physical activities such as bricklaying and gardening have been performed without exacerbating the pain.    The patient mentions that he almost canceled the appointment due to a family emergency involving his son-in-law, who experienced back and chest pains and required a midnight visit to the hospital. His daughter has been managing the situation and is currently en route to his house.    Past Medical History:   Diagnosis Date    CAD (coronary artery disease)     followed by Dr. Albarran    Chronic pneumothorax     right side    Colon cancer     Essential (primary) hypertension     H/O heart artery stent     Hemorrhoids     Mixed hyperlipidemia     Type 2 diabetes mellitus without complications        Past Surgical History:   Procedure Laterality Date    CHOLECYSTECTOMY      COLECTOMY      CORONARY ANGIOPLASTY      PLEURODESIS WITH VIDEO-ASSISTED THORACOSCOPIC SURGERY (VATS)      SHOULDER SURGERY Right        Current Outpatient Medications   Medication Sig    albuterol (PROVENTIL/VENTOLIN HFA) 90 mcg/actuation inhaler INHALE 2  PUFFS BY MOUTH 4 TIMES DAILY    aspirin 81 mg Cap aspirin Take No date recorded No form recorded No frequency recorded No route recorded No set duration recorded No set duration amount recorded active No dosage strength recorded No dosage strength units of measure recorded    hydroCHLOROthiazide (MICROZIDE) 12.5 mg capsule Take 1 capsule (12.5 mg total) by mouth once daily.    metoprolol tartrate (LOPRESSOR) 25 MG tablet Take 1 tablet (25 mg total) by mouth 2 (two) times daily.    nitroGLYCERIN (NITROSTAT) 0.4 MG SL tablet Place 0.4 mg under the tongue every 5 (five) minutes as needed.    NON FORMULARY MEDICATION Take 5 mLs by mouth 2 (two) times a day.    omeprazole (PRILOSEC) 20 MG capsule Take 1 capsule (20 mg total) by mouth once daily.    ramipriL (ALTACE) 2.5 MG capsule TAKE 1 CAPSULE ONE TIME DAILY    RYBELSUS 14 mg tablet Take 1 tablet (14 mg total) by mouth every morning. DX code E11.69    semaglutide (RYBELSUS) 14 mg tablet Take 1 tablet (14 mg total) by mouth once daily.    simvastatin (ZOCOR) 10 MG tablet Take 2 tablets (20 mg total) by mouth every evening.    tamsulosin (FLOMAX) 0.4 mg Cap TAKE 1 CAPSULE ONE TIME DAILY     No current facility-administered medications for this visit.       Review of patient's allergies indicates:   Allergen Reactions    Amoxicillin      Other reaction(s): UTI - Urinary tract infection    Codeine      Other reaction(s): Anxiety    Morphine      Other reaction(s): Anxiety       Family History   Problem Relation Name Age of Onset    Diabetes Mellitus Brother         Social History[1]        Review of Systems:    Constitution: Negative for chills, fever, and sweats.  Negative for unexplained weight loss.    HENT:  Negative for headaches and blurry vision.    Cardiovascular:Negative for chest pain or irregular heart beat. Negative for hypertension.    Respiratory:  Negative for cough and shortness of breath.    Gastrointestinal: Negative for abdominal pain, heartburn, melena,  nausea, and vomitting.    Genitourinary:  Negative bladder incontinence and dysuria.    Musculoskeletal:  See HPI    Neurological: Negative for numbness.    Psychiatric/Behavioral: Negative for depression.  The patient is not nervous/anxious.      Endocrine: Negative for polyuria    Hematologic/Lymphatic: Negative for bleeding problem.  Does not bruise/bleed easily.    Skin: Negative for poor would healing and rash      Physical Examination:    Vital Signs:    Vitals:    04/24/25 1003   BP: 113/71   Pulse: 61       Body mass index is 28.59 kg/m².    General: No acute distress, alert and oriented, healthy appearing    HEENT: Head is atraumatic, mucous membranes are moist    Neck: Supples, no JVD    Cardiovascular: Palpable dorsalis pedis and posterior tibial pulses, regular rate and rhythm to those pulses    Lungs: Breathing non-labored    Skin: no rashes appreciated    Neurologic: Can flex and extend knees, ankles, and toes. Sensation is grossly intact    Right elbow  Brisk cap refill disappeared sensation intact distally range of motion of the right elbow without significant discomfort with the pain.  It had some mild tenderness.    X-rays:  Right elbow: Patient's elbow x-rays were reviewed.  No displaced fracture.  Does have a enthesophyte of the posterior aspect of the right elbow     Assessment::  Right olecranon bursitis    Plan:  Patient has no significant discomfort or pain.  It had otherwise asymptomatic.  We will hold off on any further intervention.  Does have some worsening pain or swelling, we could perform a debridement    This note was generated with the assistance of ambient listening technology. Verbal consent was obtained by the patient and accompanying visitor(s) for the recording of patient appointment to facilitate this note. I attest to having reviewed and edited the generated note for accuracy, though some syntax or spelling errors may persist. Please contact the author of this note for any  clarification.      This note was created using [employer] voice recognition software that occasionally misinterpreted phrases or words.    Consult note is delivered via Epic messaging service.         [1]   Social History  Socioeconomic History    Marital status:    Tobacco Use    Smoking status: Former     Current packs/day: 2.00     Average packs/day: 2.0 packs/day for 40.0 years (80.0 ttl pk-yrs)     Types: Cigarettes    Smokeless tobacco: Never   Substance and Sexual Activity    Alcohol use: Yes     Alcohol/week: 2.0 standard drinks of alcohol     Types: 2 Glasses of wine per week    Drug use: Never     Social Drivers of Health     Financial Resource Strain: Low Risk  (3/10/2025)    Overall Financial Resource Strain (CARDIA)     Difficulty of Paying Living Expenses: Not hard at all   Food Insecurity: No Food Insecurity (3/10/2025)    Hunger Vital Sign     Worried About Running Out of Food in the Last Year: Never true     Ran Out of Food in the Last Year: Never true   Transportation Needs: No Transportation Needs (3/10/2025)    PRAPARE - Transportation     Lack of Transportation (Medical): No     Lack of Transportation (Non-Medical): No   Physical Activity: Sufficiently Active (3/10/2025)    Exercise Vital Sign     Days of Exercise per Week: 5 days     Minutes of Exercise per Session: 50 min   Stress: No Stress Concern Present (3/10/2025)    [employer] - Occupational Stress Questionnaire     Feeling of Stress : Not at all   Housing Stability: Low Risk  (3/10/2025)    Housing Stability Vital Sign     Unable to Pay for Housing in the Last Year: No     Homeless in the Last Year: No

## 2025-04-28 ENCOUNTER — TELEPHONE (OUTPATIENT)
Dept: INTERNAL MEDICINE | Facility: CLINIC | Age: 80
End: 2025-04-28
Payer: MEDICARE

## 2025-04-28 DIAGNOSIS — E11.69 TYPE 2 DIABETES MELLITUS WITH OTHER SPECIFIED COMPLICATION, WITHOUT LONG-TERM CURRENT USE OF INSULIN: ICD-10-CM

## 2025-04-28 DIAGNOSIS — M87.059 AVASCULAR NECROSIS OF FEMORAL HEAD, UNSPECIFIED LATERALITY: ICD-10-CM

## 2025-04-28 DIAGNOSIS — J43.1 PANLOBULAR EMPHYSEMA: ICD-10-CM

## 2025-04-28 RX ORDER — SIMVASTATIN 10 MG/1
20 TABLET, FILM COATED ORAL NIGHTLY
Qty: 90 TABLET | Refills: 2 | Status: SHIPPED | OUTPATIENT
Start: 2025-04-28

## 2025-04-28 NOTE — TELEPHONE ENCOUNTER
----- Message from Anastasiya sent at 4/28/2025  9:41 AM CDT -----  Regarding: Refill Rx  SIMVASTATIN 10MG TAB        QTY: 90           [facility]

## 2025-04-29 ENCOUNTER — OFFICE VISIT (OUTPATIENT)
Dept: INTERNAL MEDICINE | Facility: CLINIC | Age: 80
End: 2025-04-29
Payer: MEDICARE

## 2025-04-29 VITALS
TEMPERATURE: 98 F | HEIGHT: 71 IN | SYSTOLIC BLOOD PRESSURE: 96 MMHG | BODY MASS INDEX: 29.26 KG/M2 | OXYGEN SATURATION: 96 % | WEIGHT: 209 LBS | DIASTOLIC BLOOD PRESSURE: 61 MMHG | HEART RATE: 62 BPM

## 2025-04-29 DIAGNOSIS — N40.0 ENLARGED PROSTATE: ICD-10-CM

## 2025-04-29 DIAGNOSIS — E11.69 TYPE 2 DIABETES MELLITUS WITH OTHER SPECIFIED COMPLICATION, WITHOUT LONG-TERM CURRENT USE OF INSULIN: Primary | ICD-10-CM

## 2025-04-29 PROCEDURE — 1160F RVW MEDS BY RX/DR IN RCRD: CPT | Mod: CPTII,,, | Performed by: STUDENT IN AN ORGANIZED HEALTH CARE EDUCATION/TRAINING PROGRAM

## 2025-04-29 PROCEDURE — 3078F DIAST BP <80 MM HG: CPT | Mod: CPTII,,, | Performed by: STUDENT IN AN ORGANIZED HEALTH CARE EDUCATION/TRAINING PROGRAM

## 2025-04-29 PROCEDURE — 3074F SYST BP LT 130 MM HG: CPT | Mod: CPTII,,, | Performed by: STUDENT IN AN ORGANIZED HEALTH CARE EDUCATION/TRAINING PROGRAM

## 2025-04-29 PROCEDURE — 1159F MED LIST DOCD IN RCRD: CPT | Mod: CPTII,,, | Performed by: STUDENT IN AN ORGANIZED HEALTH CARE EDUCATION/TRAINING PROGRAM

## 2025-04-29 PROCEDURE — 99214 OFFICE O/P EST MOD 30 MIN: CPT | Mod: ,,, | Performed by: STUDENT IN AN ORGANIZED HEALTH CARE EDUCATION/TRAINING PROGRAM

## 2025-04-29 NOTE — PROGRESS NOTES
Subjective:      Manjit Argueta  04/29/2025  [id number]      Chief Complaint: Follow-up (DM)       History of Present Illness    Mr Manjit presents today for 3 months diabetes follow up. Patient had colonoscopy with Dr Ferreira, reports he noticed enlarged prostate and was referred to Urology for evaluation, has appointment in May.         Past Medical History:   Diagnosis Date    CAD (coronary artery disease)     followed by Dr. Albarran    Chronic pneumothorax     right side    Colon cancer     Essential (primary) hypertension     H/O heart artery stent     Hemorrhoids     Mixed hyperlipidemia     Type 2 diabetes mellitus without complications      Past Surgical History:   Procedure Laterality Date    CHOLECYSTECTOMY      COLECTOMY      CORONARY ANGIOPLASTY      PLEURODESIS WITH VIDEO-ASSISTED THORACOSCOPIC SURGERY (VATS)      SHOULDER SURGERY Right      Family History   Problem Relation Name Age of Onset    Diabetes Mellitus Brother       Social History     Tobacco Use    Smoking status: Former     Current packs/day: 2.00     Average packs/day: 2.0 packs/day for 40.0 years (80.0 ttl pk-yrs)     Types: Cigarettes    Smokeless tobacco: Never   Substance and Sexual Activity    Alcohol use: Yes     Alcohol/week: 2.0 standard drinks of alcohol     Types: 2 Glasses of wine per week    Drug use: Never    Sexual activity: Not on file     Review of patient's allergies indicates:   Allergen Reactions    Amoxicillin      Other reaction(s): UTI - Urinary tract infection    Codeine      Other reaction(s): Anxiety    Morphine      Other reaction(s): Anxiety       The following were reviewed at this visit: active problem list, medication list, allergies, family history, social history, and health maintenance.    Medications:  Current Medications[1]      Medications have been reviewed and reconciled with patient at this visit.  Barriers to medications reviewed with patient.    Adverse reactions to current medications reviewed with  "patient..    Over the counter medications reviewed and reconciled with patient.  Review of Systems   Constitutional:  Negative for chills, diaphoresis, fever and weight loss.   HENT:  Negative for congestion, ear discharge, sinus pain and sore throat.    Eyes:  Negative for photophobia.   Respiratory:  Negative for cough, hemoptysis and shortness of breath.    Cardiovascular:  Negative for chest pain, palpitations, claudication, leg swelling and PND.   Gastrointestinal:  Negative for constipation, diarrhea, nausea and vomiting.   Genitourinary:  Negative for dysuria, frequency and urgency.   Musculoskeletal:  Negative for back pain, joint pain, myalgias and neck pain.   Skin:  Negative for itching and rash.   Neurological:  Negative for dizziness, focal weakness and headaches.   Psychiatric/Behavioral:  Negative for depression. The patient does not have insomnia.            Objective:      Vitals:    04/29/25 0941   BP: 96/61   Pulse: 62   Temp: 97.6 °F (36.4 °C)   TempSrc: Temporal   SpO2: 96%   Weight: 94.8 kg (209 lb)   Height: 5' 11" (1.803 m)       Physical Exam  Constitutional:       Appearance: Normal appearance.   HENT:      Head: Normocephalic and atraumatic.   Cardiovascular:      Rate and Rhythm: Normal rate and regular rhythm.      Pulses: Normal pulses.   Pulmonary:      Effort: Pulmonary effort is normal.      Breath sounds: Normal breath sounds.   Abdominal:      General: Abdomen is flat. Bowel sounds are normal. There is no distension.      Palpations: Abdomen is soft.      Tenderness: There is no abdominal tenderness.   Musculoskeletal:      Right lower leg: No edema.      Left lower leg: No edema.   Lymphadenopathy:      Cervical: No cervical adenopathy.   Neurological:      General: No focal deficit present.      Mental Status: He is alert and oriented to person, place, and time.               Assessment and Plan:       1. Type 2 diabetes mellitus with other specified complication, without " long-term current use of insulin  Assessment & Plan:  Controlled  A1C of 5.6  Continue Rybelsus       2. Enlarged prostate  Assessment & Plan:  Has appointment coming up with Urology               Follow up: Follow up in about 3 months (around 7/29/2025) for wellness, Labs check.             [1]   Current Outpatient Medications:     albuterol (PROVENTIL/VENTOLIN HFA) 90 mcg/actuation inhaler, INHALE 2 PUFFS BY MOUTH 4 TIMES DAILY, Disp: , Rfl:     aspirin 81 mg Cap, Take 81 mg by mouth once daily., Disp: , Rfl:     hydroCHLOROthiazide (MICROZIDE) 12.5 mg capsule, Take 1 capsule (12.5 mg total) by mouth once daily., Disp: 90 capsule, Rfl: 2    metoprolol tartrate (LOPRESSOR) 25 MG tablet, Take 1 tablet (25 mg total) by mouth 2 (two) times daily., Disp: 90 tablet, Rfl: 2    nitroGLYCERIN (NITROSTAT) 0.4 MG SL tablet, Place 0.4 mg under the tongue every 5 (five) minutes as needed., Disp: , Rfl:     NON FORMULARY MEDICATION, Take 5 mLs by mouth 2 (two) times a day., Disp: , Rfl:     omeprazole (PRILOSEC) 20 MG capsule, Take 1 capsule (20 mg total) by mouth once daily., Disp: 90 capsule, Rfl: 2    ramipriL (ALTACE) 2.5 MG capsule, TAKE 1 CAPSULE ONE TIME DAILY, Disp: 30 capsule, Rfl: 11    RYBELSUS 14 mg tablet, Take 1 tablet (14 mg total) by mouth every morning. DX code E11.69, Disp: 30 tablet, Rfl: 5    semaglutide (RYBELSUS) 14 mg tablet, Take 1 tablet (14 mg total) by mouth once daily., Disp: 30 tablet, Rfl: 1    simvastatin (ZOCOR) 10 MG tablet, Take 2 tablets (20 mg total) by mouth every evening., Disp: 90 tablet, Rfl: 2    tamsulosin (FLOMAX) 0.4 mg Cap, TAKE 1 CAPSULE ONE TIME DAILY, Disp: 30 capsule, Rfl: 11

## 2025-07-17 DIAGNOSIS — I10 HYPERTENSION, UNSPECIFIED TYPE: ICD-10-CM

## 2025-07-17 RX ORDER — METOPROLOL TARTRATE 25 MG/1
25 TABLET, FILM COATED ORAL 2 TIMES DAILY
Qty: 200 TABLET | Refills: 3 | Status: SHIPPED | OUTPATIENT
Start: 2025-07-17 | End: 2026-07-17

## 2025-07-29 ENCOUNTER — TELEPHONE (OUTPATIENT)
Dept: INTERNAL MEDICINE | Facility: CLINIC | Age: 80
End: 2025-07-29
Payer: MEDICARE

## 2025-07-29 DIAGNOSIS — I10 HYPERTENSION, UNSPECIFIED TYPE: ICD-10-CM

## 2025-07-29 DIAGNOSIS — E11.69 TYPE 2 DIABETES MELLITUS WITH OTHER SPECIFIED COMPLICATION, WITHOUT LONG-TERM CURRENT USE OF INSULIN: Primary | ICD-10-CM

## 2025-07-29 DIAGNOSIS — G47.33 OBSTRUCTIVE SLEEP APNEA SYNDROME: ICD-10-CM

## 2025-07-29 NOTE — TELEPHONE ENCOUNTER
1. Are there any outstanding tasks in the patient's chart? Yes, fasting labs    2. Is there any documentation in the chart? No, labs needed     Please remind patient to arrive 15 minutes prior to appointment time.

## 2025-07-30 DIAGNOSIS — J43.1 PANLOBULAR EMPHYSEMA: ICD-10-CM

## 2025-07-30 DIAGNOSIS — M87.059 AVASCULAR NECROSIS OF FEMORAL HEAD, UNSPECIFIED LATERALITY: ICD-10-CM

## 2025-07-30 DIAGNOSIS — E11.69 TYPE 2 DIABETES MELLITUS WITH OTHER SPECIFIED COMPLICATION, WITHOUT LONG-TERM CURRENT USE OF INSULIN: ICD-10-CM

## 2025-07-30 RX ORDER — SIMVASTATIN 10 MG/1
20 TABLET, FILM COATED ORAL NIGHTLY
Qty: 200 TABLET | Refills: 3 | Status: SHIPPED | OUTPATIENT
Start: 2025-07-30

## 2025-07-31 ENCOUNTER — TELEPHONE (OUTPATIENT)
Dept: INTERNAL MEDICINE | Facility: CLINIC | Age: 80
End: 2025-07-31
Payer: MEDICARE

## 2025-07-31 DIAGNOSIS — K13.79 MOUTH SORES: Primary | ICD-10-CM

## 2025-07-31 NOTE — TELEPHONE ENCOUNTER
Copied from CRM #[id number]. Topic: General Inquiry - Patient Advice  >> Jul 31, 2025  3:19 PM Ladan wrote:  Who Called: Manjit Argueta    Caller is requesting assistance/information from provider's office.    Symptoms (please be specific):    How long has patient had these symptoms:    List of preferred pharmacies on file (remove unneeded): [unfilled]  If different, enter pharmacy into here including location and phone number: [facility] [address]      Patient's Preferred Phone Number on File: [phone]   Best Call Back Number, if different:    Additional Information: pt stated pharmacy faxed req for miracle mouth wash , that was denied. Pt stated he has abrasions on right side of tongue, pt is unable to chew on the right side, would like to determine why script was denied

## 2025-07-31 NOTE — TELEPHONE ENCOUNTER
Copied from CRM #[id number]. Topic: General Inquiry - Patient Advice  >> Jul 31, 2025  3:19 PM Ladan wrote:  Who Called: Manjit Argueta    Caller is requesting assistance/information from provider's office.    Symptoms (please be specific):    How long has patient had these symptoms:    List of preferred pharmacies on file (remove unneeded): [unfilled]  If different, enter pharmacy into here including location and phone number: [facility] [address]      Patient's Preferred Phone Number on File: [phone]   Best Call Back Number, if different:    Additional Information: pt stated pharmacy faxed req for miracle mouth wash , that was denied. Pt stated he has abrasions on right side of tongue, pt is unable to chew on the right side, would like to determine why script was denied

## 2025-07-31 NOTE — TELEPHONE ENCOUNTER
Copied from CRM #[id number]. Topic: General Inquiry - Patient Advice  >> Jul 31, 2025  3:19 PM Ladan wrote:  Who Called: Manjit Argueta    Caller is requesting assistance/information from provider's office.    Symptoms (please be specific):    How long has patient had these symptoms:    List of preferred pharmacies on file (remove unneeded): [unfilled]  If different, enter pharmacy into here including location and phone number: [facility] [address]      Patient's Preferred Phone Number on File: [phone]   Best Call Back Number, if different:    Additional Information: pt stated pharmacy faxed req for miracle mouth wash , that was denied. Pt stated he has abrasions on right side of tongue, pt is unable to chew on the right side, would like to determine why script was denied

## 2025-08-01 ENCOUNTER — TELEPHONE (OUTPATIENT)
Dept: INTERNAL MEDICINE | Facility: CLINIC | Age: 80
End: 2025-08-01
Payer: MEDICARE

## 2025-08-01 DIAGNOSIS — K13.79 MOUTH SORES: ICD-10-CM

## 2025-08-01 NOTE — TELEPHONE ENCOUNTER
Copied from CRM #[id number]. Topic: Medications - Medication Refill  >> Aug 1, 2025  9:54 AM Yuki wrote:  .Who Called: Manjit Argueta    Caller is requesting assistance/information from provider's office.    Symptoms (please be specific): N/A   How long has patient had these symptoms: N/A  List of preferred pharmacies on file (remove unneeded): [unfilled]  If different, enter pharmacy into here including location and phone number: N/A    Preferred Method of Contact: Phone Call    Patient's Preferred Phone Number on File: [phone]     Best Call Back Number, if different:    Additional Information: Pt called upset stating he contacted [facility] who informed him, they still havent received his prescription for (Magic mouthwash) 1:1:1 diphenhydrAMINE(Benadryl) 12.5mg/5ml liq, aluminum & magnesium hydroxide-simethicone (Maalox), LIDOcaine viscous 2%. Pt requesting prescription faxed to [phone]. Pt requesting a call once faxed. Please advise, thank you.

## 2025-08-22 ENCOUNTER — TELEPHONE (OUTPATIENT)
Dept: INTERNAL MEDICINE | Facility: CLINIC | Age: 80
End: 2025-08-22
Payer: MEDICARE

## 2025-08-26 ENCOUNTER — LAB VISIT (OUTPATIENT)
Dept: LAB | Facility: HOSPITAL | Age: 80
End: 2025-08-26
Attending: STUDENT IN AN ORGANIZED HEALTH CARE EDUCATION/TRAINING PROGRAM
Payer: MEDICARE

## 2025-08-29 ENCOUNTER — OFFICE VISIT (OUTPATIENT)
Dept: INTERNAL MEDICINE | Facility: CLINIC | Age: 80
End: 2025-08-29
Payer: MEDICARE

## 2025-08-29 VITALS
HEIGHT: 71 IN | OXYGEN SATURATION: 96 % | BODY MASS INDEX: 29.12 KG/M2 | DIASTOLIC BLOOD PRESSURE: 72 MMHG | TEMPERATURE: 98 F | SYSTOLIC BLOOD PRESSURE: 116 MMHG | HEART RATE: 65 BPM | WEIGHT: 208 LBS

## 2025-08-29 DIAGNOSIS — I10 PRIMARY HYPERTENSION: ICD-10-CM

## 2025-08-29 DIAGNOSIS — I25.10 CORONARY ARTERY DISEASE, UNSPECIFIED VESSEL OR LESION TYPE, UNSPECIFIED WHETHER ANGINA PRESENT, UNSPECIFIED WHETHER NATIVE OR TRANSPLANTED HEART: ICD-10-CM

## 2025-08-29 DIAGNOSIS — J43.1 PANLOBULAR EMPHYSEMA: ICD-10-CM

## 2025-08-29 DIAGNOSIS — E11.69 TYPE 2 DIABETES MELLITUS WITH OTHER SPECIFIED COMPLICATION, WITHOUT LONG-TERM CURRENT USE OF INSULIN: Primary | ICD-10-CM

## 2025-08-29 DIAGNOSIS — Z00.00 MEDICARE ANNUAL WELLNESS VISIT, SUBSEQUENT: ICD-10-CM

## 2025-08-29 DIAGNOSIS — C18.9 MALIGNANT NEOPLASM OF COLON, UNSPECIFIED PART OF COLON: ICD-10-CM

## 2025-08-29 RX ORDER — TRIAMCINOLONE ACETONIDE 1 MG/G
CREAM TOPICAL 3 TIMES DAILY
COMMUNITY

## 2025-09-03 PROBLEM — I25.10 CAD (CORONARY ARTERY DISEASE): Status: ACTIVE | Noted: 2025-09-03

## 2025-09-03 PROBLEM — I10 PRIMARY HYPERTENSION: Status: ACTIVE | Noted: 2025-09-03

## 2025-09-03 PROBLEM — Z00.00 MEDICARE ANNUAL WELLNESS VISIT, SUBSEQUENT: Status: ACTIVE | Noted: 2025-09-03

## 2025-09-05 ENCOUNTER — NURSE TRIAGE (OUTPATIENT)
Dept: ADMINISTRATIVE | Facility: CLINIC | Age: 80
End: 2025-09-05
Payer: MEDICARE